# Patient Record
Sex: MALE | Race: WHITE | Employment: OTHER | URBAN - METROPOLITAN AREA
[De-identification: names, ages, dates, MRNs, and addresses within clinical notes are randomized per-mention and may not be internally consistent; named-entity substitution may affect disease eponyms.]

---

## 2017-01-17 ENCOUNTER — OFFICE VISIT (OUTPATIENT)
Dept: CARDIOLOGY | Facility: CLINIC | Age: 72
End: 2017-01-17
Attending: INTERNAL MEDICINE
Payer: COMMERCIAL

## 2017-01-17 VITALS
SYSTOLIC BLOOD PRESSURE: 124 MMHG | HEIGHT: 68 IN | OXYGEN SATURATION: 96 % | WEIGHT: 194.7 LBS | DIASTOLIC BLOOD PRESSURE: 80 MMHG | HEART RATE: 74 BPM | BODY MASS INDEX: 29.51 KG/M2

## 2017-01-17 DIAGNOSIS — I10 ESSENTIAL HYPERTENSION: ICD-10-CM

## 2017-01-17 DIAGNOSIS — I25.10 CORONARY ARTERY DISEASE INVOLVING NATIVE CORONARY ARTERY OF NATIVE HEART WITHOUT ANGINA PECTORIS: ICD-10-CM

## 2017-01-17 PROCEDURE — 99214 OFFICE O/P EST MOD 30 MIN: CPT | Performed by: INTERNAL MEDICINE

## 2017-01-17 RX ORDER — NITROGLYCERIN 0.4 MG/1
0.4 TABLET SUBLINGUAL EVERY 5 MIN PRN
Qty: 25 TABLET | Refills: 11 | Status: SHIPPED | OUTPATIENT
Start: 2017-01-17 | End: 2018-01-17

## 2017-01-17 RX ORDER — ATORVASTATIN CALCIUM 80 MG/1
80 TABLET, FILM COATED ORAL DAILY
COMMUNITY
End: 2020-01-20

## 2017-01-17 NOTE — PROGRESS NOTES
2017      Kenneth G. Pallas, MD   [facility]   [address]    [place]  [address]       RE: Nathan Martins   MRN: [id number]   :    1945      Dear Dr. Pallas:      Nathan Martins, a 71-year-old man with coronary artery disease, hypertension and dyslipidemia was seen today at your request for followup.      Mr. Mratins underwent a stress test prior to treatment of a left wrist fracture in  that demonstrated significant ischemia.  Diagnostic coronary angiography showed a significant proximal narrowing in the LAD with a distally occluded circumflex.  The dominant right coronary had atherosclerotic change, but no significant focal narrowing and the circumflex was well collateralized.  A bare metal stent was placed in the LAD and the patient underwent uncomplicated left wrist surgery.  A repeat angiogram 8 months after his procedure showed a patent LAD stent  with only mild in-stent restenosis.  The circumflex and right coronary anatomy was unchanged.  The ejection fraction was 50%.      Since I last saw the patient in 2015, he has remained asymptomatic.  He walks his dog on a regular basis and denies chest, arm, neck or jaw discomfort with exertion.  He has had some difficulty losing weight, but his weight has stayed stable.      PAST MEDICAL HISTORY:   1.  Hypertension.   2.  Dyslipidemia.   3.  Coronary artery disease:   A. History of abnormal stress test prior to left wrist surgery.  Significant narrowing in the proximal LAD treated with bare metal stent.  Chronically occluded mid circumflex.  Right coronary artery with moderate disease.  Ejection fraction of 50%.  Repeat angiography 8 months after procedure showing continued patency at intervention site.   4.  History of left wrist fracture.      PHYSICAL EXAMINATION:   GENERAL:  Exam today demonstrates a very pleasant, cooperative, soft spoken 71-year-old man.   VITAL SIGNS:  His blood pressure is 124/80,  his heart rate 74.  His height is 1.7 meters, his weight is 88.3 kg.  His BMI is 29.7.   LUNGS:  Clear to percussion and auscultation.   CARDIOVASCULAR:  Normal S1 with a normal S2, there is no S3.  There is no murmur, rub or click.      LABORATORY STUDIES:  There has not been a more recent LDL cholesterol than 10/2015.  At that time, his LDL was 60.  He remains on high-potency statin agent.      ASSESSMENT:  Mr. Martins is asymptomatic with optimal systolic blood pressure and on a high-potency statin drug.  I have reviewed the benefits of a Mediterranean style diet, weight loss and a regular exercise program in the reduction of future adverse cardiovascular events.      RECOMMENDATIONS:   1.  Continue present excellent medical therapy. It would be reasonable to recheck his lipid profile during his next visit with you.   2.  Mediterranean-style diet.   3.  Weight loss.   4.  Exercise program.   5.  Followup visit with me in 1 year.      We greatly appreciate the opportunity to be involved in the care of your patient, Mr. Nando Martins.      Sincerely,      MD FOREST Damon MD             D: 2017 10:20   T: 2017 13:16   MT:       Name:     NANDO MARTINS   MRN:      [id number]        Account:      [id number]   :      1945           Service Date: 2017      Document: [id number]

## 2017-01-17 NOTE — MR AVS SNAPSHOT
"              After Visit Summary   1/17/2017    Nathan Martins    MRN: [id number]           Patient Information     Date Of Birth          1945        Visit Information        Provider Department      1/17/2017 10:00 AM Raman Sabillon MD [facility]        Today's Diagnoses     Coronary artery disease involving native coronary artery of native heart without angina pectoris         Essential hypertension            Follow-ups after your visit        Additional Services     Follow-Up with Cardiologist           Follow-Up with Cardiologist                 Future tests that were ordered for you today     Open Future Orders        Priority Expected Expires Ordered    Follow-Up with Cardiologist Routine 1/17/2018 6/1/2018 1/17/2017    Follow-Up with Cardiologist Routine 1/17/2018 6/1/2018 1/17/2017            Who to contact     If you have questions or need follow up information about today's clinic visit or your schedule please contact [facility] directly at [phone].  Normal or non-critical lab and imaging results will be communicated to you by MyChart, letter or phone within 4 business days after the clinic has received the results. If you do not hear from us within 7 days, please contact the clinic through [employer] or phone. If you have a critical or abnormal lab result, we will notify you by phone as soon as possible.  Submit refill requests through [employer] [facility] or call your pharmacy and they will forward the refill request to us. Please allow 3 business days for your refill to be completed.          Additional Information About Your Visit        [employer] Information     [employer] lets you send messages to your doctor, view your test results, renew your prescriptions, schedule appointments and more. To sign up, go to [email or web address] . Click on \"Log in\" on the left side of the screen, which will take you to the " "Welcome page. Then click on \"Sign up Now\" on the right side of the page.     You will be asked to enter the access code listed below, as well as some personal information. Please follow the directions to create your username and password.     Your access code is: [email or web address]  Expires: 2017 10:16 AM     Your access code will  in 90 days. If you need help or a new code, please call your [facility] clinic or [phone].        Care EveryWhere ID     This is your Care EveryWhere ID. This could be used by other organizations to access your [facility] medical records  [id number]        Your Vitals Were     Pulse Height BMI (Body Mass Index) Pulse Oximetry          74 1.727 m (5' 8\") 29.61 kg/m2 96%         Blood Pressure from Last 3 Encounters:   17 124/80   12/18/15 120/78   14 138/80    Weight from Last 3 Encounters:   17 88.315 kg (194 lb 11.2 oz)   12/18/15 86.637 kg (191 lb)   14 86.637 kg (191 lb)              We Performed the Following     Follow-Up with Cardiologist          Today's Medication Changes          These changes are accurate as of: 17 10:16 AM.  If you have any questions, ask your nurse or doctor.               These medicines have changed or have updated prescriptions.        Dose/Directions    nitroglycerin 0.4 MG sublingual tablet   Commonly known as:  NITROSTAT   This may have changed:  how much to take   Used for:  Coronary artery disease involving native coronary artery of native heart without angina pectoris        Dose:  0.4 mg   Place 1 tablet (0.4 mg) under the tongue every 5 minutes as needed for chest pain   Quantity:  25 tablet   Refills:  11            Where to get your medicines      [facility] [address] [place] [address] - [address] AT [place] [address] & [facility] [place]  [address], [facility] [address]     Phone:  [phone]    - nitroglycerin 0.4 MG sublingual tablet             Primary " Care Provider Office Phone # Fax #    Kenneth G Pallas, [phone]       [facility] [address]  [facility] [address]        Thank you!     Thank you for choosing [facility]  for your care. Our goal is always to provide you with excellent care. Hearing back from our patients is one way we can continue to improve our services. Please take a few minutes to complete the written survey that you may receive in the mail after your visit with us. Thank you!             Your Updated Medication List - Protect others around you: Learn how to safely use, store and throw away your medicines at www.disposemymeds.org.          This list is accurate as of: 1/17/17 10:16 AM.  Always use your most recent med list.                   Brand Name Dispense Instructions for use    aspirin 81 MG tablet      Take by mouth daily       atorvastatin 80 MG tablet    LIPITOR     Take 80 mg by mouth daily       carvedilol 12.5 MG tablet    COREG     Take 12.5 mg by mouth 2 times daily (with meals)       lisinopril 5 MG tablet    PRINIVIL/ZESTRIL     Take 5 mg by mouth 2 times daily.       multivitamin, therapeutic with minerals Tabs tablet      Take 1 tablet by mouth daily.       nitroglycerin 0.4 MG sublingual tablet    NITROSTAT    25 tablet    Place 1 tablet (0.4 mg) under the tongue every 5 minutes as needed for chest pain

## 2017-01-17 NOTE — PROGRESS NOTES
HPI and Plan:   See dictation    No orders of the defined types were placed in this encounter.       Orders Placed This Encounter   Medications     atorvastatin (LIPITOR) 80 MG tablet     Sig: Take 80 mg by mouth daily     nitroglycerin (NITROSTAT) 0.4 MG sublingual tablet     Sig: Place 1 tablet (0.4 mg) under the tongue every 5 minutes as needed for chest pain     Dispense:  25 tablet     Refill:  11       Medications Discontinued During This Encounter   Medication Reason     simvastatin (ZOCOR) 40 MG tablet Unavailable     nitroglycerin (NITROSTAT) 0.4 MG SL tablet Reorder         Encounter Diagnoses   Name Primary?     Coronary artery disease involving native coronary artery of native heart without angina pectoris      Essential hypertension        CURRENT MEDICATIONS:  Current Outpatient Prescriptions   Medication Sig Dispense Refill     atorvastatin (LIPITOR) 80 MG tablet Take 80 mg by mouth daily       nitroglycerin (NITROSTAT) 0.4 MG sublingual tablet Place 1 tablet (0.4 mg) under the tongue every 5 minutes as needed for chest pain 25 tablet 11     carvedilol (COREG) 12.5 MG tablet Take 12.5 mg by mouth 2 times daily (with meals)       aspirin 81 MG tablet Take by mouth daily       lisinopril (PRINIVIL,ZESTRIL) 5 MG tablet Take 5 mg by mouth 2 times daily.       multivitamin, therapeutic with minerals (THERA-VIT-M) TABS Take 1 tablet by mouth daily.       [DISCONTINUED] nitroglycerin (NITROSTAT) 0.4 MG SL tablet Place under the tongue every 5 minutes as needed for chest pain         ALLERGIES   No Known Allergies    PAST MEDICAL HISTORY:  Past Medical History   Diagnosis Date     Hypertension      Stented coronary artery 4/3/12     04/12 Proximal RCA 20% ( collateral filling of OM), CFX occluded with collaterals,  (collateral filling of distal OM),Proximal LAD, before first septal + 1st diag., has 85% stenosis,  Balloon angioplasty and BMS to proximal LAD     Coronary artery disease      04/12  Proximal RCA 20% ( collateral filling of OM), CFX occluded with collaterals (collateral filling of distal OM), Proximal LAD, before first septal + 1st diag., has 85% stenosis,  Balloon angioplasty and BMS to proximal LAD, 12/2012 Cath - minimal in stent restenosis     Arthritis      generalized     Dyslipidemia      Cardiomyopathy (H)      4/2012 EF 30-35% by Cath, 12/2012 EF 50% by cath, 11/2012 EF 42% by Echo       PAST SURGICAL HISTORY:  Past Surgical History   Procedure Laterality Date     Orthopedic surgery       right elbow     Angioplasty  4/3/12      04/12 Proximal RCA 20% ( collateral filling of OM), CFX occluded with collaterals (collateral filling of distal OM), Proximal LAD, before first septal + 1st diag., has 85% stenosis,  Balloon angioplasty and BMS to proximal LAD, 12/2012 Cath - minimal in stent restenosis     Cataract iol, rt/lt       Osteotomy wrist  5/22/2012     Procedure:OSTEOTOMY WRIST; Left Distal Radius Corrective Osteotomy         FAMILY HISTORY:  Family History   Problem Relation Age of Onset     Other - See Comments Mother 96     old age     CANCER Father 47     pancreatic     DIABETES Sister        SOCIAL HISTORY:  Social History     Social History     Marital Status:      Spouse Name: N/A     Number of Children: N/A     Years of Education: N/A     Social History Main Topics     Smoking status: Former Smoker     Types: Cigarettes     Quit date: 01/05/2001     Smokeless tobacco: Not on file     Alcohol Use: 0.0 oz/week     0 Standard drinks or equivalent per week      Comment: occas     Drug Use: No     Sexual Activity: Not on file     Other Topics Concern     Caffeine Concern No     1-2 cups per day     Sleep Concern No     Stress Concern No     Weight Concern No     Special Diet No     Exercise Yes     walking dog 2 times daily     Seat Belt Yes     Social History Narrative       Review of Systems:  Skin:  Positive for bruising     Eyes:  Positive for glasses  "for computers  ENT:  Negative      Respiratory:  Negative       Cardiovascular:  Negative      Gastroenterology: Positive for reflux Occasional reflux takes TUMs prn  Genitourinary:  Negative      Musculoskeletal:  Positive for joint pain;joint stiffness;foot pain left knee - sees a chiropractor,  top of both feet hurt  Neurologic:  Negative      Psychiatric:  Negative      Heme/Lymph/Imm:  Negative      Endocrine:  Negative        Physical Exam:  Vitals: /80 mmHg  Pulse 74  Ht 1.727 m (5' 8\")  Wt 88.315 kg (194 lb 11.2 oz)  BMI 29.61 kg/m2  SpO2 96%    Constitutional:  cooperative, alert and oriented, well developed, well nourished, in no acute distress        Skin:  warm and dry to the touch, no apparent skin lesions or masses noted        Head:  normocephalic, no masses or lesions        Eyes:  pupils equal and round, conjunctivae and lids unremarkable, sclera white, no xanthalasma, EOMS intact, no nystagmus        ENT:  no pallor or cyanosis, dentition good        Neck:  carotid pulses are full and equal bilaterally, JVP normal, no carotid bruit, no thyromegaly        Chest:  normal breath sounds, clear to auscultation, normal A-P diameter, normal symmetry, normal respiratory excursion, no use of accessory muscles          Cardiac: regular rhythm, normal S1/S2, no S3 or S4, apical impulse not displaced, no murmurs, gallops or rubs                  Abdomen:  abdomen soft, non-tender, BS normoactive, no mass, no HSM, no bruits        Vascular: pulses full and equal, no bruits auscultated                                        Extremities and Back:  no deformities, clubbing, cyanosis, erythema observed              Neurological:  affect appropriate, oriented to time, person and place              CC  Raman Sabillon MD   [facility]  [address]  [place] [address]                "

## 2017-01-17 NOTE — Clinical Note
2017      Kenneth G. Pallas, MD   [facility]   [address]    [place]  [address]       RE: Nathan Martins   MRN: [id number]   :    1945      Dear Dr. Pallas:      Nathan Martins, a 71-year-old man with coronary artery disease, hypertension and dyslipidemia was seen today at your request for followup.      Mr. Martins underwent a stress test prior to treatment of a left wrist fracture in  that demonstrated significant ischemia.  Diagnostic coronary angiography showed a significant proximal narrowing in the LAD with a distally occluded circumflex.  The dominant right coronary had atherosclerotic change, but no significant focal narrowing and the circumflex was well collateralized.  A bare metal stent was placed in the LAD and the patient underwent uncomplicated left wrist surgery.  A repeat angiogram 8 months after his procedure showed a patent LAD stent  with only mild in-stent restenosis.  The circumflex and right coronary anatomy was unchanged.  The ejection fraction was 50%.      Since I last saw the patient in 2015, he has remained asymptomatic.  He walks his dog on a regular basis and denies chest, arm, neck or jaw discomfort with exertion.  He has had some difficulty losing weight, but his weight has stayed stable.      PAST MEDICAL HISTORY:   1.  Hypertension.   2.  Dyslipidemia.   3.  Coronary artery disease:   A. History of abnormal stress test prior to left wrist surgery.  Significant narrowing in the proximal LAD treated with bare metal stent.  Chronically occluded mid circumflex.  Right coronary artery with moderate disease.  Ejection fraction of 50%.  Repeat angiography 8 months after procedure showing continued patency at intervention site.   4.  History of left wrist fracture.      PHYSICAL EXAMINATION:   GENERAL:  Exam today demonstrates a very pleasant, cooperative, soft spoken 71-year-old man.   VITAL SIGNS:  His blood pressure is 124/80,  his heart rate 74.  His height is 1.7 meters, his weight is 88.3 kg.  His BMI is 29.7.   LUNGS:  Clear to percussion and auscultation.   CARDIOVASCULAR:  Normal S1 with a normal S2, there is no S3.  There is no murmur, rub or click.      LABORATORY STUDIES:  There has not been a more recent LDL cholesterol than 10/2015.  At that time, his LDL was 60.  He remains on high-potency statin agent.      ASSESSMENT:  Mr. Martins is asymptomatic with optimal systolic blood pressure and on a high-potency statin drug.  I have reviewed the benefits of a Mediterranean style diet, weight loss and a regular exercise program in the reduction of future adverse cardiovascular events.      RECOMMENDATIONS:   1.  Continue present excellent medical therapy. It would be reasonable to recheck his lipid profile during his next visit with you.   2.  Mediterranean-style diet.   3.  Weight loss.   4.  Exercise program.   5.  Followup visit with me in 1 year.      We greatly appreciate the opportunity to be involved in the care of your patient, Mr. Nathan Martins.      Sincerely,      Raman Sabillon MD

## 2017-12-12 ENCOUNTER — TRANSFERRED RECORDS (OUTPATIENT)
Dept: HEALTH INFORMATION MANAGEMENT | Facility: CLINIC | Age: 72
End: 2017-12-12

## 2017-12-12 LAB
ALBUMIN SERPL-MCNC: 3.6 G/DL (ref 3.4–5)
ALP SERPL-CCNC: 75 U/L (ref 0–116)
ALT SERPL-CCNC: 36 U/L (ref 0–78)
ANION GAP SERPL CALCULATED.3IONS-SCNC: ABNORMAL MMOL/L
AST SERPL-CCNC: 27 U/L (ref 0–37)
BILIRUB SERPL-MCNC: 0.88 MG/DL (ref 0.2–1)
BUN SERPL-MCNC: 14 MG/DL (ref 7–18)
CALCIUM SERPL-MCNC: 8.8 MG/DL (ref 9.5–10.1)
CHLORIDE SERPLBLD-SCNC: 109 MMOL/L (ref 98–107)
CHOLEST SERPL-MCNC: 129 MG/DL (ref 0–200)
CO2 SERPL-SCNC: ABNORMAL MMOL/L
CREAT SERPL-MCNC: 0.86 MG/DL (ref 0.6–1.3)
GFR SERPL CREATININE-BSD FRML MDRD: ABNORMAL ML/MIN/1.73M2
GLUCOSE SERPL-MCNC: 123 MG/DL (ref 70–99)
HDLC SERPL-MCNC: 58 MG/DL (ref 40–96)
LDLC SERPL CALC-MCNC: 59.4 MG/DL (ref 0–130)
NONHDLC SERPL-MCNC: NORMAL MG/DL
POTASSIUM SERPL-SCNC: 4.2 MMOL/L (ref 3.5–5.1)
PROT SERPL-MCNC: 6.4 G/DL (ref 6.4–8.2)
SODIUM SERPL-SCNC: 144 MMOL/L (ref 136–145)
TRIGL SERPL-MCNC: 53 MG/DL (ref 30–200)

## 2018-01-12 ENCOUNTER — DOCUMENTATION ONLY (OUTPATIENT)
Dept: CARDIOLOGY | Facility: CLINIC | Age: 73
End: 2018-01-12

## 2018-01-17 ENCOUNTER — OFFICE VISIT (OUTPATIENT)
Dept: CARDIOLOGY | Facility: CLINIC | Age: 73
End: 2018-01-17
Attending: INTERNAL MEDICINE
Payer: COMMERCIAL

## 2018-01-17 VITALS
SYSTOLIC BLOOD PRESSURE: 128 MMHG | BODY MASS INDEX: 28.64 KG/M2 | HEIGHT: 68 IN | WEIGHT: 189 LBS | HEART RATE: 72 BPM | DIASTOLIC BLOOD PRESSURE: 88 MMHG

## 2018-01-17 DIAGNOSIS — I25.10 CORONARY ARTERY DISEASE INVOLVING NATIVE CORONARY ARTERY OF NATIVE HEART WITHOUT ANGINA PECTORIS: ICD-10-CM

## 2018-01-17 DIAGNOSIS — I10 ESSENTIAL HYPERTENSION: ICD-10-CM

## 2018-01-17 PROCEDURE — 99214 OFFICE O/P EST MOD 30 MIN: CPT | Performed by: INTERNAL MEDICINE

## 2018-01-17 RX ORDER — NITROGLYCERIN 0.4 MG/1
0.4 TABLET SUBLINGUAL EVERY 5 MIN PRN
Qty: 25 TABLET | Refills: 11 | Status: SHIPPED | OUTPATIENT
Start: 2018-01-17 | End: 2019-01-18

## 2018-01-17 NOTE — PROGRESS NOTES
HISTORY OF PRESENT ILLNESS:  Nathan Martins, a 72-year-old man with coronary artery disease, hypertension, dyslipidemia, and a distant history of left wrist fracture was seen today at your request for followup.      Mr. Martins underwent a stress test prior to treatment of a left wrist fracture in 2012.  The stress test showed significant ischemia.  Diagnostic coronary angiography showed a significant narrowing in the proximal LAD with a distally occluded circumflex coronary.  The dominant right coronary had atherosclerotic change with no significant focal narrowing and the circumflex was well collateralized.  A bare metal stent was placed in the LAD and the patient underwent uncomplicated left wrist surgery.  Repeat angiogram 8 months after the procedure showed a patent LAD stent with no significant restenosis.  The circumflex and right coronary anatomy was unchanged.  The ejection fraction was 50%.      Since I last saw the patient about 1 year ago, he has remained asymptomatic.  He does not exercise as regularly as he would like to in the winter, but has been free of symptoms.  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      PAST MEDICAL HISTORY:   1.  Hypertension.   2.  Dyslipidemia.   3.  Coronary artery disease:   a.  Abnormal stress test prior to left wrist surgery.   b.  History of stent implantation in LAD.   c.  Repeat angiogram demonstrating continued patency of LAD stent   4.  Chronic distal occlusion of circumflex.  No significant narrowing in right coronary.   5.  History of left wrist fracture.      PHYSICAL EXAMINATION:   GENERAL:  Exam today demonstrates a very pleasant, cooperative and youthful appearing 72-year-old man.   VITAL SIGNS:  His blood pressure was 128/88, his heart rate was 72.   LUNGS:  Clear to percussion and auscultation.   CARDIOVASCULAR:  Shows a normal S1 with a normal S2.  There is no S3.  There is no murmur, rub or click.      LABORATORY  STUDIES:  An LDL cholesterol in your office in December was 59.      ASSESSMENT:  Mr. Martins is asymptomatic on guideline-directed medical therapy with optimal LDL cholesterol and systolic blood pressure levels.  I have nothing to add to his present excellent medical therapy.  I have reviewed the importance of a regular exercise program, weight loss, and a Mediterranean-style diet.  The patient plans to start working out on a treadmill this month.      RECOMMENDATIONS:   1.  Continue present medical therapy.   2.  Regular aerobic exercise 40 minutes 4 times a week and 7 times a week if possible.   3.  Mediterranean style diet.   4.  Weight control.   5.  Followup visit with me in about 1 year.      We greatly appreciate the opportunity to see your patient, Mr. Nando Martins.      cc:   Kenneth G. Pallas, MD   [address]  [address]         FOREST KIM MD             D: 2018 10:02   T: 2018 12:33   MT: KHANG      Name:     NANDO MARTNIS   MRN:      [id number]        Account:      [id number]   :      1945           Service Date: 2018      Document: [id number]

## 2018-01-17 NOTE — LETTER
1/17/2018      Kenneth G. Pallas, MD  [facility] [address]  [facility] [address]      RE: Nathan Martins       Dear Colleague,    I had the pleasure of seeing Nathan Martins in the [facility].    HISTORY OF PRESENT ILLNESS:  Nathan Martins, a 72-year-old man with coronary artery disease, hypertension, dyslipidemia, and a distant history of left wrist fracture was seen today at your request for followup.      Mr. Martins underwent a stress test prior to treatment of a left wrist fracture in 2012.  The stress test showed significant ischemia.  Diagnostic coronary angiography showed a significant narrowing in the proximal LAD with a distally occluded circumflex coronary.  The dominant right coronary had atherosclerotic change with no significant focal narrowing and the circumflex was well collateralized.  A bare metal stent was placed in the LAD and the patient underwent uncomplicated left wrist surgery.  Repeat angiogram 8 months after the procedure showed a patent LAD stent with no significant restenosis.  The circumflex and right coronary anatomy was unchanged.  The ejection fraction was 50%.      Since I last saw the patient about 1 year ago, he has remained asymptomatic.  He does not exercise as regularly as he would like to in the winter, but has been free of symptoms.  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      PAST MEDICAL HISTORY:   1.  Hypertension.   2.  Dyslipidemia.   3.  Coronary artery disease:   a.  Abnormal stress test prior to left wrist surgery.   b.  History of stent implantation in LAD.   c.  Repeat angiogram demonstrating continued patency of LAD stent   4.  Chronic distal occlusion of circumflex.  No significant narrowing in right coronary.   5.  History of left wrist fracture.      PHYSICAL EXAMINATION:   GENERAL:  Exam today demonstrates a very pleasant, cooperative and youthful appearing  72-year-old man.   VITAL SIGNS:  His blood pressure was 128/88, his heart rate was 72.   LUNGS:  Clear to percussion and auscultation.   CARDIOVASCULAR:  Shows a normal S1 with a normal S2.  There is no S3.  There is no murmur, rub or click.      LABORATORY STUDIES:  An LDL cholesterol in your office in December was 59.      ASSESSMENT:  Mr. Martins is asymptomatic on guideline-directed medical therapy with optimal LDL cholesterol and systolic blood pressure levels.  I have nothing to add to his present excellent medical therapy.  I have reviewed the importance of a regular exercise program, weight loss, and a Mediterranean-style diet.  The patient plans to start working out on a treadmill this month.      RECOMMENDATIONS:   1.  Continue present medical therapy.   2.  Regular aerobic exercise 40 minutes 4 times a week and 7 times a week if possible.   3.  Mediterranean style diet.   4.  Weight control.   5.  Followup visit with me in about 1 year.      We greatly appreciate the opportunity to see your patient, Mr. Nathan Martins.       Outpatient Encounter Prescriptions as of 1/17/2018   Medication Sig Dispense Refill     Multiple Vitamins-Minerals (CENTRUM SILVER 50+MEN PO) Take by mouth daily       cholecalciferol (VITAMIN  -D) 1000 UNITS capsule Take 1 capsule by mouth daily       Cyanocobalamin (VITAMIN B-12 SL) Place 1 mL under the tongue daily       nitroGLYcerin (NITROSTAT) 0.4 MG sublingual tablet Place 1 tablet (0.4 mg) under the tongue every 5 minutes as needed for chest pain 25 tablet 11     atorvastatin (LIPITOR) 80 MG tablet Take 80 mg by mouth daily       carvedilol (COREG) 12.5 MG tablet Take 12.5 mg by mouth 2 times daily (with meals)       aspirin 81 MG tablet Take by mouth daily       lisinopril (PRINIVIL,ZESTRIL) 5 MG tablet Take 5 mg by mouth 2 times daily.       [DISCONTINUED] nitroglycerin (NITROSTAT) 0.4 MG sublingual tablet Place 1 tablet (0.4 mg) under the tongue every 5 minutes as needed for  chest pain 25 tablet 11     [DISCONTINUED] multivitamin, therapeutic with minerals (THERA-VIT-M) TABS Take 1 tablet by mouth daily.       No facility-administered encounter medications on file as of 1/17/2018.        Again, thank you for allowing me to participate in the care of your patient.      Sincerely,    Raman Sabillon MD     [facility]

## 2018-01-17 NOTE — MR AVS SNAPSHOT
"              After Visit Summary   1/17/2018    Nathan Martins    MRN: [id number]           Patient Information     Date Of Birth          1945        Visit Information        Provider Department      1/17/2018 9:30 AM Raman Sabillon MD [facility]        Today's Diagnoses     Coronary artery disease involving native coronary artery of native heart without angina pectoris        Essential hypertension           Follow-ups after your visit        Additional Services     Follow-Up with Cardiologist           Follow-Up with Cardiologist                 Future tests that were ordered for you today     Open Future Orders        Priority Expected Expires Ordered    Follow-Up with Cardiologist Routine 1/17/2019 1/18/2019 1/17/2018    Follow-Up with Cardiologist Routine 1/17/2019 1/18/2019 1/17/2018            Who to contact     If you have questions or need follow up information about today's clinic visit or your schedule please contact [facility] directly at [phone].  Normal or non-critical lab and imaging results will be communicated to you by [employer], letter or phone within 4 business days after the clinic has received the results. If you do not hear from us within 7 days, please contact the clinic through [employer] or phone. If you have a critical or abnormal lab result, we will notify you by phone as soon as possible.  Submit refill requests through [employer] or call your pharmacy and they will forward the refill request to us. Please allow 3 business days for your refill to be completed.          Additional Information About Your Visit        [employer]Be[employer] Information     [employer] lets you send messages to your doctor, view your test results, renew your prescriptions, schedule appointments and more. To sign up, go to [email or web address] . Click on \"Log in\" on the left side of the screen, which will take you to the " "Welcome page. Then click on \"Sign up Now\" on the right side of the page.     You will be asked to enter the access code listed below, as well as some personal information. Please follow the directions to create your username and password.     Your access code is: [email or web address]  Expires: 2018  9:52 AM     Your access code will  in 90 days. If you need help or a new code, please call your [facility] clinic or [phone].        Care EveryWhere ID     This is your Care EveryWhere ID. This could be used by other organizations to access your [facility] medical records  [id number]        Your Vitals Were     Pulse Height BMI (Body Mass Index)             72 1.727 m (5' 8\") 28.74 kg/m2          Blood Pressure from Last 3 Encounters:   18 128/88   17 124/80   12/18/15 120/78    Weight from Last 3 Encounters:   18 85.7 kg (189 lb)   17 88.3 kg (194 lb 11.2 oz)   12/18/15 86.6 kg (191 lb)              We Performed the Following     Follow-Up with Cardiologist     Follow-Up with Cardiologist          Where to get your medicines      These medications were sent to [facility] [address] [place] [address] [id number] AT [place] [address] & [facility]  [address], [facility] [address]     Phone:  [phone]     nitroGLYcerin 0.4 MG sublingual tablet          Primary Care Provider Office Phone # Fax #    Kenneth G Pallas, [phone]       [facility] [address] [facility] [address]        Equal Access to Services     ILIANA [facility] HospitalROSIE AH: Hadii ruth Matson, saurabh fischer, kamala mackalmamelissa wallis, karin vera. So [facility] [phone].    ATENCIÓN: Si habla español, tiene a arreola disposición servicios gratuitos de asistencia lingüística. Llame al [phone].    We comply with applicable federal civil rights laws and [place] laws. We do not discriminate on the basis of race, color, national " origin, age, disability, sex, sexual orientation, or gender identity.            Thank you!     Thank you for choosing [facility]  for your care. Our goal is always to provide you with excellent care. Hearing back from our patients is one way we can continue to improve our services. Please take a few minutes to complete the written survey that you may receive in the mail after your visit with us. Thank you!             Your Updated Medication List - Protect others around you: Learn how to safely use, store and throw away your medicines at www.disposemymeds.org.          This list is accurate as of: 1/17/18  9:57 AM.  Always use your most recent med list.                   Brand Name Dispense Instructions for use Diagnosis    aspirin 81 MG tablet      Take by mouth daily        atorvastatin 80 MG tablet    LIPITOR     Take 80 mg by mouth daily        carvedilol 12.5 MG tablet    COREG     Take 12.5 mg by mouth 2 times daily (with meals)        CENTRUM SILVER 50+MEN PO      Take by mouth daily        cholecalciferol 1000 UNITS capsule    vitamin  -D     Take 1 capsule by mouth daily        lisinopril 5 MG tablet    PRINIVIL/ZESTRIL     Take 5 mg by mouth 2 times daily.        nitroGLYcerin 0.4 MG sublingual tablet    NITROSTAT    25 tablet    Place 1 tablet (0.4 mg) under the tongue every 5 minutes as needed for chest pain    Coronary artery disease involving native coronary artery of native heart without angina pectoris       VITAMIN B-12 SL      Place 1 mL under the tongue daily

## 2018-01-17 NOTE — PROGRESS NOTES
HISTORY OF PRESENT ILLNESS:    Doing well. NO angina.     Orders this Visit:  Orders Placed This Encounter   Procedures     Follow-Up with Cardiologist     Orders Placed This Encounter   Medications     Multiple Vitamins-Minerals (CENTRUM SILVER 50+MEN PO)     Sig: Take by mouth daily     cholecalciferol (VITAMIN  -D) 1000 UNITS capsule     Sig: Take 1 capsule by mouth daily     Cyanocobalamin (VITAMIN B-12 SL)     Sig: Place 1 mL under the tongue daily     Medications Discontinued During This Encounter   Medication Reason     multivitamin, therapeutic with minerals (THERA-VIT-M) TABS Medication Reconciliation Clean Up       Encounter Diagnoses   Name Primary?     Coronary artery disease involving native coronary artery of native heart without angina pectoris      Essential hypertension        CURRENT MEDICATIONS:  Current Outpatient Prescriptions   Medication Sig Dispense Refill     Multiple Vitamins-Minerals (CENTRUM SILVER 50+MEN PO) Take by mouth daily       cholecalciferol (VITAMIN  -D) 1000 UNITS capsule Take 1 capsule by mouth daily       Cyanocobalamin (VITAMIN B-12 SL) Place 1 mL under the tongue daily       atorvastatin (LIPITOR) 80 MG tablet Take 80 mg by mouth daily       nitroglycerin (NITROSTAT) 0.4 MG sublingual tablet Place 1 tablet (0.4 mg) under the tongue every 5 minutes as needed for chest pain 25 tablet 11     carvedilol (COREG) 12.5 MG tablet Take 12.5 mg by mouth 2 times daily (with meals)       aspirin 81 MG tablet Take by mouth daily       lisinopril (PRINIVIL,ZESTRIL) 5 MG tablet Take 5 mg by mouth 2 times daily.         ALLERGIES   No Known Allergies    PAST MEDICAL, SURGICAL, FAMILY, SOCIAL HISTORY:  History was reviewed and updated as needed, see medical record.    Review of Systems:  A 12-point review of systems was completed, see medical record for detailed review of systems information.    Physical Exam:  Vitals: /88 (BP Location: Right arm, Patient Position: Sitting, Cuff Size:  "Adult Regular)  Pulse 72  Ht 1.727 m (5' 8\")  Wt 85.7 kg (189 lb)  BMI 28.74 kg/m2    Constitutional:           Skin:           Head:           Eyes:           ENT:           Neck:           Chest:           Cardiac:                    Abdomen:           Vascular:                                        Extremities and Back:           Neurological:           ASSESSMENT: doing well       RECOMMENDATIONS: GDMT fu in one year      Recent Lab Results:  LIPID RESULTS:  Lab Results   Component Value Date    CHOL 129.0 12/12/2017    HDL 58.0 12/12/2017    LDL 59.4 12/12/2017    TRIG 53.0 12/12/2017    CHOLHDLRATIO 2.2 10/29/2015       LIVER ENZYME RESULTS:  Lab Results   Component Value Date    AST 27.0 08/12/2017    ALT 36.0 08/12/2017       CBC RESULTS:  Lab Results   Component Value Date    WBC 9.6 06/11/2014    RBC 4.56 06/11/2014    HGB 14.9 06/11/2014    HCT 44.4 06/11/2014    MCV 97.4 06/11/2014    MCH 32.7 06/11/2014    MCHC 33.6 06/11/2014    RDW 14.7 06/11/2014     06/11/2014     12/05/2012       BMP RESULTS:  Lab Results   Component Value Date    .0 08/12/2017    POTASSIUM 4.2 08/12/2017    CHLORIDE 109.0 (A) 08/12/2017    CO2 25 12/05/2012    ANIONGAP 10 12/05/2012    .0 (A) 08/12/2017    BUN 14.0 08/12/2017    CR 0.86 08/12/2017    GFRESTIMATED >90 12/05/2012    GFRESTBLACK >90 12/05/2012    TORY 8.8 (A) 08/12/2017        A1C RESULTS:  No results found for: A1C    INR RESULTS:  Lab Results   Component Value Date    INR 1.03 12/05/2012       We greatly appreciate the opportunity to be involved in the care of your patient, Nathan Martins.    Sincerely,  Raman Sabillon MD      CC  Raman Sabillon MD  [address]  [place] [id number]                                                                     "

## 2019-01-18 ENCOUNTER — OFFICE VISIT (OUTPATIENT)
Dept: CARDIOLOGY | Facility: CLINIC | Age: 74
End: 2019-01-18
Payer: COMMERCIAL

## 2019-01-18 VITALS
WEIGHT: 188.2 LBS | SYSTOLIC BLOOD PRESSURE: 122 MMHG | HEIGHT: 69 IN | DIASTOLIC BLOOD PRESSURE: 80 MMHG | HEART RATE: 66 BPM | BODY MASS INDEX: 27.88 KG/M2

## 2019-01-18 DIAGNOSIS — I10 ESSENTIAL HYPERTENSION: ICD-10-CM

## 2019-01-18 DIAGNOSIS — I25.10 CORONARY ARTERY DISEASE INVOLVING NATIVE CORONARY ARTERY OF NATIVE HEART WITHOUT ANGINA PECTORIS: Primary | ICD-10-CM

## 2019-01-18 PROCEDURE — 99214 OFFICE O/P EST MOD 30 MIN: CPT | Performed by: INTERNAL MEDICINE

## 2019-01-18 RX ORDER — NITROGLYCERIN 0.4 MG/1
0.4 TABLET SUBLINGUAL EVERY 5 MIN PRN
Qty: 25 TABLET | Refills: 11 | Status: SHIPPED | OUTPATIENT
Start: 2019-01-18 | End: 2020-01-20

## 2019-01-18 ASSESSMENT — MIFFLIN-ST. JEOR: SCORE: 1589.05

## 2019-01-18 NOTE — PROGRESS NOTES
Service Date: 01/18/2019      HISTORY OF PRESENT ILLNESS:  Nathan Martins, a 73-year-old man with coronary artery disease, hypertension, and dyslipidemia  was seen today at your request for followup.      Since I saw the patient 1 year ago, he remains entirely free of symptoms.  He walks  on a regular basis without chest pain, dyspnea, palpitation or lightheadedness.  He tolerates his medications well.      He has a followup visit with you planned in the next several days.      PAST MEDICAL HISTORY:   1.  Hypertension.   2.  Dyslipidemia.   3.  Coronary artery disease:   a.  History of abnormal stress test prior to left wrist surgery.   b.  Diagnostic angiography showing distal occlusion of circumflex with good collaterals.  No significant narrowing in dominant right coronary.  Normal left main.  Significant narrowing in proximal LAD successfully treated with bare metal stent.  Repeat angiography 1 year later showing continued patency at the LAD stent site and no change in other vessels.  4.  History of left wrist fracture.      PHYSICAL EXAMINATION:   GENERAL:  Exam today demonstrates a very pleasant, cooperative and intelligent 73-year-old man who looks much younger than stated age.   VITAL SIGNS:  His blood pressure is 122/80, his heart rate is 66.  His height is 1.75 meters, his weight is 85.4 kg.  His BMI is 27.9.   LUNGS:  Clear to percussion and auscultation.   CARDIOVASCULAR:  Exam shows a normal S1 with a normal S2, no S3.  There is no murmur, rub or click.      LABORATORY STUDIES:  His most recent lipid profile was in 2017 that showed an LDL cholesterol of 59.  His last creatinine was 0.86.  Both values will be repeated at the time of his upcoming visit with you.      ASSESSMENT:  Mr. Martins is asymptomatic on guideline-directed medical therapy with optimal systolic blood pressure and LDL cholesterol levels.  I have nothing to add to his current excellent medical therapy and would encourage continued  lifestyle intervention.      RECOMMENDATIONS:   1.  Mediterranean-style weight-loss diet.   2.  Regular exercise at least 4-7 times a week.   3.  Continue present medical therapy.   4.  Followup visit in 1 year.      We greatly appreciate the opportunity to be involved in the care of your patient, Mr. Martins.      Raman Sabillon MD       cc:   Kenneth Pallas, MD    [address]  [address]         RAMAN SABILLON MD             D: 2019   T: 2019   MT: JAILENE      Name:     NANDO MARTINS   MRN:      [id number]        Account:      [id number]   :      1945           Service Date: 2019      Document: [id number]

## 2019-01-18 NOTE — LETTER
"1/18/2019    Kenneth G. Pallas, MD  [facility] [address]  [facility] [address]    RE: Nathan Martins       Dear Colleague,    I had the pleasure of seeing Nathan Martins in the [facility].    HISTORY OF PRESENT ILLNESS:  Asymptomatic. bp great. Walks regularily.     Orders this Visit:  No orders of the defined types were placed in this encounter.    No orders of the defined types were placed in this encounter.    There are no discontinued medications.    No diagnosis found.    CURRENT MEDICATIONS:  Current Outpatient Medications   Medication Sig Dispense Refill     aspirin 81 MG tablet Take by mouth daily       atorvastatin (LIPITOR) 80 MG tablet Take 80 mg by mouth daily       carvedilol (COREG) 12.5 MG tablet Take 12.5 mg by mouth 2 times daily (with meals)       cholecalciferol (VITAMIN  -D) 1000 UNITS capsule Take 1 capsule by mouth daily       lisinopril (PRINIVIL,ZESTRIL) 5 MG tablet Take 5 mg by mouth 2 times daily.       Multiple Vitamins-Minerals (CENTRUM SILVER 50+MEN PO) Take by mouth daily       nitroGLYcerin (NITROSTAT) 0.4 MG sublingual tablet Place 1 tablet (0.4 mg) under the tongue every 5 minutes as needed for chest pain 25 tablet 11     Cyanocobalamin (VITAMIN B-12 SL) Place 1 mL under the tongue daily         ALLERGIES   No Known Allergies    PAST MEDICAL, SURGICAL, FAMILY, SOCIAL HISTORY:  History was reviewed and updated as needed, see medical record.    Review of Systems:  A 12-point review of systems was completed, see medical record for detailed review of systems information.    Physical Exam:  Vitals: /80 (BP Location: Right arm, Patient Position: Sitting, Cuff Size: Adult Regular)   Pulse 66   Ht 1.753 m (5' 9\")   Wt 85.4 kg (188 lb 3.2 oz)   BMI 27.79 kg/m       Constitutional:           Skin:           Head:           Eyes:           ENT:           Neck:           Chest:           Cardiac:                    Abdomen: "           Vascular:                                        Extremities and Back:           Neurological:           ASSESSMENT:  Doing well       RECOMMENDATIONS: continue present medications      Recent Lab Results:  LIPID RESULTS:  Lab Results   Component Value Date    CHOL 129.0 12/12/2017    HDL 58.0 12/12/2017    LDL 59.4 12/12/2017    TRIG 53.0 12/12/2017    CHOLHDLRATIO 2.2 10/29/2015       LIVER ENZYME RESULTS:  Lab Results   Component Value Date    AST 27.0 08/12/2017    ALT 36.0 08/12/2017       CBC RESULTS:  Lab Results   Component Value Date    WBC 9.6 06/11/2014    RBC 4.56 06/11/2014    HGB 14.9 06/11/2014    HCT 44.4 06/11/2014    MCV 97.4 06/11/2014    MCH 32.7 06/11/2014    MCHC 33.6 06/11/2014    RDW 14.7 06/11/2014     06/11/2014     12/05/2012       BMP RESULTS:  Lab Results   Component Value Date    .0 08/12/2017    POTASSIUM 4.2 08/12/2017    CHLORIDE 109.0 (A) 08/12/2017    CO2 25 12/05/2012    ANIONGAP 10 12/05/2012    .0 (A) 08/12/2017    BUN 14.0 08/12/2017    CR 0.86 08/12/2017    GFRESTIMATED >90 12/05/2012    GFRESTBLACK >90 12/05/2012    TORY 8.8 (A) 08/12/2017        A1C RESULTS:  No results found for: A1C    INR RESULTS:  Lab Results   Component Value Date    INR 1.03 12/05/2012       We greatly appreciate the opportunity to be involved in the care of your patient, Nathan Martins.    Sincerely,  Raman Sabillon MD      CC  Kenneth G Pallas, MD  [facility]  [address] [place] [address]                                                                       Thank you for allowing me to participate in the care of your patient.      Sincerely,     Raman Sabillon MD     [facility]    cc:   Kenneth G Pallas, MD  [facility]  [address] [place] [address]

## 2019-01-18 NOTE — LETTER
1/18/2019      Kenneth G. Pallas, MD  [facility] [address]  [facility] [address]      RE: Nathan Martins       Dear Colleague,    I had the pleasure of seeing Nathan Martins in the [facility].    Service Date: 01/18/2019      HISTORY OF PRESENT ILLNESS:  Nathan Martins, a 73-year-old man with coronary artery disease, hypertension, and dyslipidemia  was seen today at your request for followup.      Since I saw the patient 1 year ago, he remains entirely free of symptoms.  He walks  on a regular basis without chest pain, dyspnea, palpitation or lightheadedness.  He tolerates his medications well.      He has a followup visit with you planned in the next several days.      PAST MEDICAL HISTORY:   1.  Hypertension.   2.  Dyslipidemia.   3.  Coronary artery disease:   a.  History of abnormal stress test prior to left wrist surgery.   b.  Diagnostic angiography showing distal occlusion of circumflex with good collaterals.  No significant narrowing in dominant right coronary.  Normal left main.  Significant narrowing in proximal LAD successfully treated with bare metal stent.  Repeat angiography 1 year later showing continued patency at the LAD stent site and no change in other vessels.  4.  History of left wrist fracture.      PHYSICAL EXAMINATION:   GENERAL:  Exam today demonstrates a very pleasant, cooperative and intelligent 73-year-old man who looks much younger than stated age.   VITAL SIGNS:  His blood pressure is 122/80, his heart rate is 66.  His height is 1.75 meters, his weight is 85.4 kg.  His BMI is 27.9.   LUNGS:  Clear to percussion and auscultation.   CARDIOVASCULAR:  Exam shows a normal S1 with a normal S2, no S3.  There is no murmur, rub or click.      LABORATORY STUDIES:  His most recent lipid profile was in 2017 that showed an LDL cholesterol of 59.  His last creatinine was 0.86.  Both values will be repeated at the time of his upcoming  visit with you.      ASSESSMENT:  Mr. Martins is asymptomatic on guideline-directed medical therapy with optimal systolic blood pressure and LDL cholesterol levels.  I have nothing to add to his current excellent medical therapy and would encourage continued lifestyle intervention.      RECOMMENDATIONS:   1.  Mediterranean-style weight-loss diet.   2.  Regular exercise at least 4-7 times a week.   3.  Continue present medical therapy.   4.  Followup visit in 1 year.      We greatly appreciate the opportunity to be involved in the care of your patient, Mr. Martins.      Raman Sabillon MD       cc:   Kenneth Pallas, MD    [address]  [address]         RAMAN SABILLON MD             D: 2019   T: 2019   MT: JAILENE      Name:     NANDO MARTINS   MRN:      [id number]        Account:      [id number]   :      1945           Service Date: 2019      Document: [id number]           Outpatient Encounter Medications as of 2019   Medication Sig Dispense Refill     aspirin 81 MG tablet Take by mouth daily       atorvastatin (LIPITOR) 80 MG tablet Take 80 mg by mouth daily       carvedilol (COREG) 12.5 MG tablet Take 12.5 mg by mouth 2 times daily (with meals)       cholecalciferol (VITAMIN  -D) 1000 UNITS capsule Take 1 capsule by mouth daily       lisinopril (PRINIVIL,ZESTRIL) 5 MG tablet Take 5 mg by mouth 2 times daily.       Multiple Vitamins-Minerals (CENTRUM SILVER 50+MEN PO) Take by mouth daily       nitroGLYcerin (NITROSTAT) 0.4 MG sublingual tablet Place 1 tablet (0.4 mg) under the tongue every 5 minutes as needed for chest pain 25 tablet 11     Cyanocobalamin (VITAMIN B-12 SL) Place 1 mL under the tongue daily       [DISCONTINUED] nitroGLYcerin (NITROSTAT) 0.4 MG sublingual tablet Place 1 tablet (0.4 mg) under the tongue every 5 minutes as needed for chest pain 25 tablet 11     No facility-administered encounter  medications on file as of 1/18/2019.        Again, thank you for allowing me to participate in the care of your patient.      Sincerely,    Raman Sabillon MD     [facility]

## 2019-01-18 NOTE — PROGRESS NOTES
"HISTORY OF PRESENT ILLNESS:  Asymptomatic. bp great. Walks regularily.     Orders this Visit:  No orders of the defined types were placed in this encounter.    No orders of the defined types were placed in this encounter.    There are no discontinued medications.    No diagnosis found.    CURRENT MEDICATIONS:  Current Outpatient Medications   Medication Sig Dispense Refill     aspirin 81 MG tablet Take by mouth daily       atorvastatin (LIPITOR) 80 MG tablet Take 80 mg by mouth daily       carvedilol (COREG) 12.5 MG tablet Take 12.5 mg by mouth 2 times daily (with meals)       cholecalciferol (VITAMIN  -D) 1000 UNITS capsule Take 1 capsule by mouth daily       lisinopril (PRINIVIL,ZESTRIL) 5 MG tablet Take 5 mg by mouth 2 times daily.       Multiple Vitamins-Minerals (CENTRUM SILVER 50+MEN PO) Take by mouth daily       nitroGLYcerin (NITROSTAT) 0.4 MG sublingual tablet Place 1 tablet (0.4 mg) under the tongue every 5 minutes as needed for chest pain 25 tablet 11     Cyanocobalamin (VITAMIN B-12 SL) Place 1 mL under the tongue daily         ALLERGIES   No Known Allergies    PAST MEDICAL, SURGICAL, FAMILY, SOCIAL HISTORY:  History was reviewed and updated as needed, see medical record.    Review of Systems:  A 12-point review of systems was completed, see medical record for detailed review of systems information.    Physical Exam:  Vitals: /80 (BP Location: Right arm, Patient Position: Sitting, Cuff Size: Adult Regular)   Pulse 66   Ht 1.753 m (5' 9\")   Wt 85.4 kg (188 lb 3.2 oz)   BMI 27.79 kg/m      Constitutional:           Skin:           Head:           Eyes:           ENT:           Neck:           Chest:           Cardiac:                    Abdomen:           Vascular:                                        Extremities and Back:           Neurological:           ASSESSMENT:  Doing well       RECOMMENDATIONS: continue present medications      Recent Lab Results:  LIPID RESULTS:  Lab Results "   Component Value Date    CHOL 129.0 12/12/2017    HDL 58.0 12/12/2017    LDL 59.4 12/12/2017    TRIG 53.0 12/12/2017    CHOLHDLRATIO 2.2 10/29/2015       LIVER ENZYME RESULTS:  Lab Results   Component Value Date    AST 27.0 08/12/2017    ALT 36.0 08/12/2017       CBC RESULTS:  Lab Results   Component Value Date    WBC 9.6 06/11/2014    RBC 4.56 06/11/2014    HGB 14.9 06/11/2014    HCT 44.4 06/11/2014    MCV 97.4 06/11/2014    MCH 32.7 06/11/2014    MCHC 33.6 06/11/2014    RDW 14.7 06/11/2014     06/11/2014     12/05/2012       BMP RESULTS:  Lab Results   Component Value Date    .0 08/12/2017    POTASSIUM 4.2 08/12/2017    CHLORIDE 109.0 (A) 08/12/2017    CO2 25 12/05/2012    ANIONGAP 10 12/05/2012    .0 (A) 08/12/2017    BUN 14.0 08/12/2017    CR 0.86 08/12/2017    GFRESTIMATED >90 12/05/2012    GFRESTBLACK >90 12/05/2012    TORY 8.8 (A) 08/12/2017        A1C RESULTS:  No results found for: A1C    INR RESULTS:  Lab Results   Component Value Date    INR 1.03 12/05/2012       We greatly appreciate the opportunity to be involved in the care of your patient, Nathan MENARD Eliezer.    Sincerely,  Raman Sabillon MD      CC  Kenneth G Pallas, MD  [facility]  [address] [place] [address]

## 2019-01-21 ENCOUNTER — TRANSFERRED RECORDS (OUTPATIENT)
Dept: HEALTH INFORMATION MANAGEMENT | Facility: CLINIC | Age: 74
End: 2019-01-21

## 2019-01-21 LAB
ALBUMIN SERPL-MCNC: 3.7 G/DL (ref 3.4–5)
ALP SERPL-CCNC: 65 U/L (ref 0–116)
ALT SERPL-CCNC: 34 U/L (ref 0–78)
ANION GAP SERPL CALCULATED.3IONS-SCNC: ABNORMAL MMOL/L
AST SERPL-CCNC: 22 U/L (ref 0–37)
BASOPHILS NFR BLD AUTO: 0.4 % (ref 0–2)
BILIRUB SERPL-MCNC: 0.54 MG/DL (ref 0.2–1)
BUN SERPL-MCNC: 16 MG/DL (ref 7–18)
CALCIUM SERPL-MCNC: 8.7 MG/DL (ref 8.5–10.1)
CHLORIDE SERPLBLD-SCNC: 107 MMOL/L (ref 98–107)
CHOLEST SERPL-MCNC: 133 MG/DL (ref 0–200)
CO2 SERPL-SCNC: ABNORMAL MMOL/L
CREAT SERPL-MCNC: 0.84 MG/DL (ref 0.6–1.3)
EOSINOPHIL NFR BLD AUTO: 5.6 % (ref 0–6)
ERYTHROCYTE [DISTWIDTH] IN BLOOD BY AUTOMATED COUNT: 13.6 % (ref 11.5–14.5)
GFR SERPL CREATININE-BSD FRML MDRD: ABNORMAL ML/MIN/1.73M2
GLUCOSE SERPL-MCNC: 113 MG/DL (ref 70–99)
HCT VFR BLD AUTO: 44 % (ref 40–51)
HDLC SERPL-MCNC: 55 MG/DL (ref 40–96)
HEMOGLOBIN: 14.6 G/DL (ref 14–17)
LDLC SERPL CALC-MCNC: 68.8 MG/DL (ref 0–130)
LYMPHOCYTES NFR BLD AUTO: 16.7 % (ref 20–46)
MCH RBC QN AUTO: 31.8 PG (ref 26.5–33)
MCHC RBC AUTO-ENTMCNC: 33.2 G/DL (ref 31.5–36.5)
MCV RBC AUTO: 95.9 FL (ref 78–99)
MONOCYTES NFR BLD AUTO: 8.2 % (ref 0–12)
NEUTROPHILS NFR BLD AUTO: 68.8 % (ref 38–70)
PLATELET COUNT - QUEST: 206 10^9/L (ref 150–430)
POTASSIUM SERPL-SCNC: 4.4 MMOL/L (ref 3.5–5.1)
PROT SERPL-MCNC: 6.5 G/DL (ref 6.4–8.2)
RBC # BLD AUTO: 4.59 10^12/L (ref 4.4–5.7)
SODIUM SERPL-SCNC: 143 MMOL/L (ref 136–145)
TRIGL SERPL-MCNC: 46 MG/DL (ref 30–200)
VLDL CHOLESTEROL: 9.2 MG/DL (ref 5–40)
WBC # BLD AUTO: 9 10^9/L (ref 4–10)

## 2019-01-23 ENCOUNTER — DOCUMENTATION ONLY (OUTPATIENT)
Dept: CARDIOLOGY | Facility: CLINIC | Age: 74
End: 2019-01-23

## 2019-01-23 NOTE — PROGRESS NOTES
Lab results received from [facility]. Labs entered as external results and records sent to scan.

## 2020-01-20 ENCOUNTER — OFFICE VISIT (OUTPATIENT)
Dept: CARDIOLOGY | Facility: CLINIC | Age: 75
End: 2020-01-20
Payer: COMMERCIAL

## 2020-01-20 VITALS
SYSTOLIC BLOOD PRESSURE: 140 MMHG | HEART RATE: 80 BPM | DIASTOLIC BLOOD PRESSURE: 80 MMHG | WEIGHT: 181 LBS | HEIGHT: 68 IN | BODY MASS INDEX: 27.43 KG/M2

## 2020-01-20 DIAGNOSIS — I10 ESSENTIAL HYPERTENSION: ICD-10-CM

## 2020-01-20 DIAGNOSIS — I25.10 CORONARY ARTERY DISEASE INVOLVING NATIVE CORONARY ARTERY OF NATIVE HEART WITHOUT ANGINA PECTORIS: Primary | ICD-10-CM

## 2020-01-20 PROCEDURE — 99214 OFFICE O/P EST MOD 30 MIN: CPT | Performed by: INTERNAL MEDICINE

## 2020-01-20 RX ORDER — CARVEDILOL 12.5 MG/1
12.5 TABLET ORAL 2 TIMES DAILY WITH MEALS
Qty: 60 TABLET | Refills: 11 | Status: SHIPPED | OUTPATIENT
Start: 2020-01-20 | End: 2021-01-14

## 2020-01-20 RX ORDER — ATORVASTATIN CALCIUM 80 MG/1
80 TABLET, FILM COATED ORAL DAILY
Qty: 30 TABLET | Refills: 11 | Status: SHIPPED | OUTPATIENT
Start: 2020-01-20 | End: 2021-01-14

## 2020-01-20 RX ORDER — LISINOPRIL 10 MG/1
20 TABLET ORAL 2 TIMES DAILY
Qty: 60 TABLET | Refills: 11 | Status: SHIPPED | OUTPATIENT
Start: 2020-01-20 | End: 2021-01-14

## 2020-01-20 RX ORDER — NITROGLYCERIN 0.4 MG/1
0.4 TABLET SUBLINGUAL EVERY 5 MIN PRN
Qty: 25 TABLET | Refills: 11 | Status: SHIPPED | OUTPATIENT
Start: 2020-01-20 | End: 2021-01-14

## 2020-01-20 ASSESSMENT — MIFFLIN-ST. JEOR: SCORE: 1535.51

## 2020-01-20 NOTE — PROGRESS NOTES
"HISTORY OF PRESENT ILLNESS:  Asymptomatic,     Orders this Visit:  No orders of the defined types were placed in this encounter.    No orders of the defined types were placed in this encounter.    Medications Discontinued During This Encounter   Medication Reason     Cyanocobalamin (VITAMIN B-12 SL)        No diagnosis found.    CURRENT MEDICATIONS:  Current Outpatient Medications   Medication Sig Dispense Refill     aspirin 81 MG tablet Take by mouth daily       atorvastatin (LIPITOR) 80 MG tablet Take 80 mg by mouth daily       carvedilol (COREG) 12.5 MG tablet Take 12.5 mg by mouth 2 times daily (with meals)       cholecalciferol (VITAMIN  -D) 1000 UNITS capsule Take 1 capsule by mouth daily       lisinopril (PRINIVIL,ZESTRIL) 5 MG tablet Take 5 mg by mouth 2 times daily.       Multiple Vitamins-Minerals (CENTRUM SILVER 50+MEN PO) Take by mouth daily       nitroGLYcerin (NITROSTAT) 0.4 MG sublingual tablet Place 1 tablet (0.4 mg) under the tongue every 5 minutes as needed for chest pain 25 tablet 11       ALLERGIES   No Known Allergies    PAST MEDICAL, SURGICAL, FAMILY, SOCIAL HISTORY:  History was reviewed and updated as needed, see medical record.    Review of Systems:  A 12-point review of systems was completed, see medical record for detailed review of systems information.    Physical Exam:  Vitals: BP (!) 140/80   Pulse 80   Ht 1.727 m (5' 8\")   Wt 82.1 kg (181 lb)   BMI 27.52 kg/m      Constitutional:           Skin:           Head:           Eyes:           ENT:           Neck:           Chest:           Cardiac:                    Abdomen:           Vascular:                                        Extremities and Back:           Neurological:           ASSESSMENT: BP still not quite at goal.        RECOMMENDATIONS:    Increase lisinopril to 20 daily   Follow-up with primary care   If bp contol not good, could add diuretic  Follow-up in one year      Recent Lab Results:  LIPID RESULTS:  Lab Results "   Component Value Date    CHOL 133 01/21/2019    HDL 55 01/21/2019    LDL 68.8 01/21/2019    TRIG 46 01/21/2019    CHOLHDLRATIO 2.2 10/29/2015       LIVER ENZYME RESULTS:  Lab Results   Component Value Date    AST 22.0 01/21/2019    ALT 34.0 01/21/2019       CBC RESULTS:  Lab Results   Component Value Date    WBC 9.0 01/21/2019    RBC 4.59 01/21/2019    HGB 14.6 01/21/2019    HCT 44.0 01/21/2019    MCV 95.9 01/21/2019    MCH 31.8 01/21/2019    MCHC 33.2 01/21/2019    RDW 13.6 01/21/2019     01/21/2019     12/05/2012       BMP RESULTS:  Lab Results   Component Value Date     01/21/2019    POTASSIUM 4.4 01/21/2019    CHLORIDE 107 01/21/2019    CO2 25 12/05/2012    ANIONGAP 10 12/05/2012     (A) 01/21/2019    BUN 16 01/21/2019    CR 0.84 01/21/2019    GFRESTIMATED >90 12/05/2012    GFRESTBLACK >90 12/05/2012    TORY 8.7 01/21/2019        A1C RESULTS:  No results found for: A1C    INR RESULTS:  Lab Results   Component Value Date    INR 1.03 12/05/2012       We greatly appreciate the opportunity to be involved in the care of your patient, Nathan Martins.    Sincerely,  Raman Sabillon MD        Kenneth G Pallas, MD  [facility]  [address] [place] [address]

## 2020-01-20 NOTE — PROGRESS NOTES
Service Date: 01/20/2020      CARDIOLOGY CLINIC VISIT NOTE      PRIMARY CARE DOCTOR:  Dr. Pallas      HISTORY OF PRESENT ILLNESS:  Nathan Martins, a 74-year-old man with hypertension, dyslipidemia, and coronary artery disease, was seen today at your request for followup.      Since last seen 1 year ago, Mr. Martins remains asymptomatic.  He has been compliant with medical therapy.  He denies chest, arm, neck, jaw or back discomfort with exertion.      PAST MEDICAL HISTORY:   1.  Hypertension.   2.  Dyslipidemia.   3.  Coronary artery disease.   a.  Abnormal stress test prior to left wrist surgery.   b.  Diagnostic angiography showing distal occlusion of circumflex with good collaterals.  No significant narrowing in dominant right coronary, left main.  Significant narrowing in LAD, treated with bare metal stent.  Repeat coronary angiography 1 year after intervention showing continued patency at LAD stent site and no change in other vessels.      PHYSICAL EXAMINATION:   GENERAL:  Today, demonstrates a very pleasant and cooperative 74-year-old man who looks younger than stated age.   VITAL SIGNS:  His blood pressure is 140/80, his heart rate is 80.  His height is 1.73 meters, his weight is 82.1 kg, his BMI is 27.5.   LUNGS:  Clear to percussion and auscultation.   CARDIOVASCULAR:  Shows a normal S1 with a normal S2.  There is no S3.  There is no murmur, rub or click.  His pulses are full symmetrical in the carotid, radial, brachial, femoral, popliteal, dorsalis pedis, posterior tibial.      LABORATORY STUDIES:  His most recent LDL cholesterol last year was 68.8.  His most recent creatinine was 0.84.      ASSESSMENT:  Mr. Martins is asymptomatic on guideline-directed medical therapy.  His systolic blood pressure exceeds 130 mmHg systolic, the preferred target on the  most recent ACC guidelines.  In addition to lifestyle intervention, I advised that we increase lisinopril from 10 to 20 mg daily.  The patient will  recheck his blood pressure at home and follow up in your office.  If his systolic pressure remains greater than 130, I would add hydrochlorothiazide 12.5 mg daily.      In the meantime, we again reviewed the features of a Mediterranean-style weight loss diet and regular exercise program.      RECOMMENDATIONS:   1.  Mediterranean-style weight loss diet.   2.  Regular exercise program 40 minutes 4-7 times a week.   3.  Increase lisinopril from 10 to 20 mg a day.   4.  The patient will check his blood pressure at home and follow up with his primary care doctor.  If the systolic pressure remains greater than 130, I would advise adding hydrochlorothiazide at 12.5 mg daily.   5.  Follow up in 1 year with me.      We greatly appreciate the opportunity to see your patient, Mr. Nando Martins.      Raman Sabillon MD       cc:   Kenneth Pallas, MD    [facility]    [address] [place] [address]         RAMAN SABILLON MD             D: 2020   T: 2020   MT: DW      Name:     NANDO MARTINS   MRN:      [id number]        Account:      [id number]   :      1945           Service Date: 2020      Document: [id number]

## 2020-01-20 NOTE — LETTER
"1/20/2020    Kenneth G. Pallas, MD  [facility] [address]  [facility] [address]    RE: Nathan Martins       Dear Colleague,    I had the pleasure of seeing Nathan Martins in the [facility].    HISTORY OF PRESENT ILLNESS:  Asymptomatic,     Orders this Visit:  No orders of the defined types were placed in this encounter.    No orders of the defined types were placed in this encounter.    Medications Discontinued During This Encounter   Medication Reason     Cyanocobalamin (VITAMIN B-12 SL)        No diagnosis found.    CURRENT MEDICATIONS:  Current Outpatient Medications   Medication Sig Dispense Refill     aspirin 81 MG tablet Take by mouth daily       atorvastatin (LIPITOR) 80 MG tablet Take 80 mg by mouth daily       carvedilol (COREG) 12.5 MG tablet Take 12.5 mg by mouth 2 times daily (with meals)       cholecalciferol (VITAMIN  -D) 1000 UNITS capsule Take 1 capsule by mouth daily       lisinopril (PRINIVIL,ZESTRIL) 5 MG tablet Take 5 mg by mouth 2 times daily.       Multiple Vitamins-Minerals (CENTRUM SILVER 50+MEN PO) Take by mouth daily       nitroGLYcerin (NITROSTAT) 0.4 MG sublingual tablet Place 1 tablet (0.4 mg) under the tongue every 5 minutes as needed for chest pain 25 tablet 11       ALLERGIES   No Known Allergies    PAST MEDICAL, SURGICAL, FAMILY, SOCIAL HISTORY:  History was reviewed and updated as needed, see medical record.    Review of Systems:  A 12-point review of systems was completed, see medical record for detailed review of systems information.    Physical Exam:  Vitals: BP (!) 140/80   Pulse 80   Ht 1.727 m (5' 8\")   Wt 82.1 kg (181 lb)   BMI 27.52 kg/m       Constitutional:           Skin:           Head:           Eyes:           ENT:           Neck:           Chest:           Cardiac:                    Abdomen:           Vascular:                                        Extremities and Back:           Neurological:       "     ASSESSMENT: BP still not quite at goal.        RECOMMENDATIONS:    Increase lisinopril to 20 daily   Follow-up with primary care   If bp contol not good, could add diuretic  Follow-up in one year      Recent Lab Results:  LIPID RESULTS:  Lab Results   Component Value Date    CHOL 133 01/21/2019    HDL 55 01/21/2019    LDL 68.8 01/21/2019    TRIG 46 01/21/2019    CHOLHDLRATIO 2.2 10/29/2015       LIVER ENZYME RESULTS:  Lab Results   Component Value Date    AST 22.0 01/21/2019    ALT 34.0 01/21/2019       CBC RESULTS:  Lab Results   Component Value Date    WBC 9.0 01/21/2019    RBC 4.59 01/21/2019    HGB 14.6 01/21/2019    HCT 44.0 01/21/2019    MCV 95.9 01/21/2019    MCH 31.8 01/21/2019    MCHC 33.2 01/21/2019    RDW 13.6 01/21/2019     01/21/2019     12/05/2012       BMP RESULTS:  Lab Results   Component Value Date     01/21/2019    POTASSIUM 4.4 01/21/2019    CHLORIDE 107 01/21/2019    CO2 25 12/05/2012    ANIONGAP 10 12/05/2012     (A) 01/21/2019    BUN 16 01/21/2019    CR 0.84 01/21/2019    GFRESTIMATED >90 12/05/2012    GFRESTBLACK >90 12/05/2012    TORY 8.7 01/21/2019        A1C RESULTS:  No results found for: A1C    INR RESULTS:  Lab Results   Component Value Date    INR 1.03 12/05/2012       We greatly appreciate the opportunity to be involved in the care of your patient, Nathan Martins.    Sincerely,  Raman Sabillon MD      CC  Kenneth G Pallas, MD  [facility]  [address] [place] [address]                                                                       Thank you for allowing me to participate in the care of your patient.      Sincerely,     Raman Sabillon MD     [facility]    cc:   Kenneth G Pallas, MD  [facility]  [address] [place] [address]

## 2020-01-20 NOTE — LETTER
1/20/2020      Kenneth G. Pallas, MD  [facility] [address]  [facility] [address]      RE: Nathan Martins       Dear Colleague,    I had the pleasure of seeing Nathan Martins in the [facility].    Service Date: 01/20/2020      CARDIOLOGY CLINIC VISIT NOTE      PRIMARY CARE DOCTOR:  Dr. Pallas      HISTORY OF PRESENT ILLNESS:  Nathan Martins, a 74-year-old man with hypertension, dyslipidemia, and coronary artery disease, was seen today at your request for followup.      Since last seen 1 year ago, Mr. Martins remains asymptomatic.  He has been compliant with medical therapy.  He denies chest, arm, neck, jaw or back discomfort with exertion.      PAST MEDICAL HISTORY:   1.  Hypertension.   2.  Dyslipidemia.   3.  Coronary artery disease.   a.  Abnormal stress test prior to left wrist surgery.   b.  Diagnostic angiography showing distal occlusion of circumflex with good collaterals.  No significant narrowing in dominant right coronary, left main.  Significant narrowing in LAD, treated with bare metal stent.  Repeat coronary angiography 1 year after intervention showing continued patency at LAD stent site and no change in other vessels.      PHYSICAL EXAMINATION:   GENERAL:  Today, demonstrates a very pleasant and cooperative 74-year-old man who looks younger than stated age.   VITAL SIGNS:  His blood pressure is 140/80, his heart rate is 80.  His height is 1.73 meters, his weight is 82.1 kg, his BMI is 27.5.   LUNGS:  Clear to percussion and auscultation.   CARDIOVASCULAR:  Shows a normal S1 with a normal S2.  There is no S3.  There is no murmur, rub or click.  His pulses are full symmetrical in the carotid, radial, brachial, femoral, popliteal, dorsalis pedis, posterior tibial.      LABORATORY STUDIES:  His most recent LDL cholesterol last year was 68.8.  His most recent creatinine was 0.84.      ASSESSMENT:  Mr. Martins is asymptomatic on  guideline-directed medical therapy.  His systolic blood pressure exceeds 130 mmHg systolic, the preferred target on the  most recent ACC guidelines.  In addition to lifestyle intervention, I advised that we increase lisinopril from 10 to 20 mg daily.  The patient will recheck his blood pressure at home and follow up in your office.  If his systolic pressure remains greater than 130, I would add hydrochlorothiazide 12.5 mg daily.      In the meantime, we again reviewed the features of a Mediterranean-style weight loss diet and regular exercise program.      RECOMMENDATIONS:   1.  Mediterranean-style weight loss diet.   2.  Regular exercise program 40 minutes 4-7 times a week.   3.  Increase lisinopril from 10 to 20 mg a day.   4.  The patient will check his blood pressure at home and follow up with his primary care doctor.  If the systolic pressure remains greater than 130, I would advise adding hydrochlorothiazide at 12.5 mg daily.   5.  Follow up in 1 year with me.      We greatly appreciate the opportunity to see your patient, Mr. Nando Martins.      Raman Sabillon MD       cc:   Kenneth Pallas, MD    [facility]    [address] [place] [address]         RAMAN SABILLON MD             D: 2020   T: 2020   MT: DW      Name:     NANDO MARTINS   MRN:      [id number]        Account:      [id number]   :      1945           Service Date: 2020      Document: [id number]           Outpatient Encounter Medications as of 2020   Medication Sig Dispense Refill     aspirin 81 MG tablet Take by mouth daily       atorvastatin (LIPITOR) 80 MG tablet Take 1 tablet (80 mg) by mouth daily 30 tablet 11     carvedilol (COREG) 12.5 MG tablet Take 1 tablet (12.5 mg) by mouth 2 times daily (with meals) 60 tablet 11     cholecalciferol (VITAMIN  -D) 1000 UNITS capsule Take 1 capsule by mouth daily       lisinopril (PRINIVIL/ZESTRIL) 10 MG tablet Take 2 tablets (20 mg)  by mouth 2 times daily 60 tablet 11     Multiple Vitamins-Minerals (CENTRUM SILVER 50+MEN PO) Take by mouth daily       nitroGLYcerin (NITROSTAT) 0.4 MG sublingual tablet Place 1 tablet (0.4 mg) under the tongue every 5 minutes as needed for chest pain 25 tablet 11     [DISCONTINUED] atorvastatin (LIPITOR) 80 MG tablet Take 80 mg by mouth daily       [DISCONTINUED] carvedilol (COREG) 12.5 MG tablet Take 12.5 mg by mouth 2 times daily (with meals)       [DISCONTINUED] Cyanocobalamin (VITAMIN B-12 SL) Place 1 mL under the tongue daily       [DISCONTINUED] lisinopril (PRINIVIL,ZESTRIL) 5 MG tablet Take 5 mg by mouth 2 times daily.       [DISCONTINUED] nitroGLYcerin (NITROSTAT) 0.4 MG sublingual tablet Place 1 tablet (0.4 mg) under the tongue every 5 minutes as needed for chest pain 25 tablet 11     No facility-administered encounter medications on file as of 1/20/2020.        Again, thank you for allowing me to participate in the care of your patient.      Sincerely,    Raman Sabillon MD     [facility]

## 2020-10-06 ENCOUNTER — HOSPITAL ENCOUNTER (EMERGENCY)
Facility: CLINIC | Age: 75
Discharge: HOME | End: 2020-10-06
Attending: EMERGENCY MEDICINE | Admitting: EMERGENCY MEDICINE
Payer: COMMERCIAL

## 2020-10-06 ENCOUNTER — APPOINTMENT (OUTPATIENT)
Dept: GENERAL RADIOLOGY | Facility: CLINIC | Age: 75
End: 2020-10-06
Attending: EMERGENCY MEDICINE
Payer: COMMERCIAL

## 2020-10-06 VITALS
BODY MASS INDEX: 28.14 KG/M2 | WEIGHT: 190 LBS | HEIGHT: 69 IN | OXYGEN SATURATION: 99 % | DIASTOLIC BLOOD PRESSURE: 102 MMHG | RESPIRATION RATE: 16 BRPM | TEMPERATURE: 97.6 F | HEART RATE: 99 BPM | SYSTOLIC BLOOD PRESSURE: 142 MMHG

## 2020-10-06 DIAGNOSIS — S81.812A LACERATION OF LOWER LEG, LEFT, INITIAL ENCOUNTER: Primary | ICD-10-CM

## 2020-10-06 PROCEDURE — 250N000011 HC RX IP 250 OP 636: Performed by: EMERGENCY MEDICINE

## 2020-10-06 PROCEDURE — 99283 EMERGENCY DEPT VISIT LOW MDM: CPT | Mod: 25

## 2020-10-06 PROCEDURE — 12035 INTMD RPR S/A/T/EXT 12.6-20: CPT

## 2020-10-06 PROCEDURE — 90715 TDAP VACCINE 7 YRS/> IM: CPT | Performed by: EMERGENCY MEDICINE

## 2020-10-06 PROCEDURE — 73590 X-RAY EXAM OF LOWER LEG: CPT | Mod: LT

## 2020-10-06 PROCEDURE — 90471 IMMUNIZATION ADMIN: CPT | Performed by: EMERGENCY MEDICINE

## 2020-10-06 PROCEDURE — 90471 IMMUNIZATION ADMIN: CPT

## 2020-10-06 RX ORDER — HYDROCODONE BITARTRATE AND ACETAMINOPHEN 5; 325 MG/1; MG/1
1 TABLET ORAL EVERY 6 HOURS PRN
Qty: 10 TABLET | Refills: 0 | Status: SHIPPED | OUTPATIENT
Start: 2020-10-06 | End: 2020-10-09

## 2020-10-06 RX ORDER — CEPHALEXIN 500 MG/1
500 CAPSULE ORAL 4 TIMES DAILY
Qty: 40 CAPSULE | Refills: 0 | Status: SHIPPED | OUTPATIENT
Start: 2020-10-06 | End: 2020-10-16

## 2020-10-06 RX ORDER — LIDOCAINE HYDROCHLORIDE AND EPINEPHRINE 10; 10 MG/ML; UG/ML
INJECTION, SOLUTION INFILTRATION; PERINEURAL
Status: DISCONTINUED
Start: 2020-10-06 | End: 2020-10-06 | Stop reason: HOSPADM

## 2020-10-06 RX ADMIN — CLOSTRIDIUM TETANI TOXOID ANTIGEN (FORMALDEHYDE INACTIVATED), CORYNEBACTERIUM DIPHTHERIAE TOXOID ANTIGEN (FORMALDEHYDE INACTIVATED), BORDETELLA PERTUSSIS TOXOID ANTIGEN (GLUTARALDEHYDE INACTIVATED), BORDETELLA PERTUSSIS FILAMENTOUS HEMAGGLUTININ ANTIGEN (FORMALDEHYDE INACTIVATED), BORDETELLA PERTUSSIS PERTACTIN ANTIGEN, AND BORDETELLA PERTUSSIS FIMBRIAE 2/3 ANTIGEN 0.5 ML: 5; 2; 2.5; 5; 3; 5 INJECTION, SUSPENSION INTRAMUSCULAR at 14:45

## 2020-10-06 ASSESSMENT — MIFFLIN-ST. JEOR: SCORE: 1587.21

## 2020-10-06 ASSESSMENT — ENCOUNTER SYMPTOMS
WOUND: 1
HEADACHES: 0
BACK PAIN: 0

## 2020-10-06 NOTE — ED AVS SNAPSHOT
[facility]  [address]  [facility] [address]  Phone: [phone]  Fax: [phone]                                    Nathan Martins   MRN: [id number]    Department: [facility]   Date of Visit: 10/6/2020           After Visit Summary Signature Page    I have received my discharge instructions, and my questions have been answered. I have discussed any challenges I see with this plan with the nurse or doctor.    ..........................................................................................................................................  Patient/Patient Representative Signature      ..........................................................................................................................................  Patient Representative Print Name and Relationship to Patient    ..................................................               ................................................  Date                                   Time    ..........................................................................................................................................  Reviewed by Signature/Title    ...................................................              ..............................................  Date                                               Time          22EPIC Rev 08/18

## 2020-10-06 NOTE — DISCHARGE INSTRUCTIONS
Discharge Instructions  Laceration (Cut)    You were seen today for a laceration (cut).  Your provider examined your laceration for any problems such a buried foreign body (like glass, a splinter, or gravel), or injury to blood vessels, tendons, and nerves.  Your provider may have also rinsed and/or scrubbed your laceration to help prevent an infection. It may not be possible to find all problems with your laceration on the first visit; occasionally foreign bodies or a tendon injury can go undetected.    Your laceration may have been closed in one of several ways:  No closure: many wounds will heal just fine without closure.  Stitches: regular stitches that require removal.  Staples: skin staples are often used in the scalp/head.  Wound adhesive (glue): skin glue can be used for certain lacerations and doesn t require removal.  Wound strips (aka Butterfly bandages or steri-strips): these are bandages that help to close a wound.  Absorbable stitches:  dissolving  stitches that go away on their own and usually don t require removal.    A small percentage of wounds will develop an infection regardless of how well the wound is cared for. Antibiotics are generally not indicated to prevent an infection so are only given for a small number of high-risk wounds. Some lacerations are too high risk to close, and are left open to heal because closure can increase the likelihood that an infection will develop.    Remember that all lacerations, no matter how expertly repaired, will cause scarring. We consider many factors, techniques, and materials, in our efforts to provide the best possible cosmetic outcome.    Generally, every Emergency Department visit should have a follow-up clinic visit with either a primary or a specialty clinic/provider. Please follow-up as instructed by your emergency provider today.     Return to the Emergency Department right away if:  You have more redness, swelling, pain, drainage (pus), a bad smell,  or red streaking from your laceration as these symptoms could indicate an infection.  You have a fever of 100.4 F or more.  You have bleeding that you cannot stop at home. If your cut starts to bleed, hold pressure on the bleeding area with a clean cloth or put pressure over the bandage.  If the bleeding does not stop after using constant pressure for 30 minutes, you should return to the Emergency Department for further treatment.  An area past the laceration is cool, pale, or blue compared with the other side, or has a slower return of color when squeezed.  Your dressing seems too tight or starts to get uncomfortable or painful. For children, signs of a problem might be irritability or restlessness.  You have loss of normal function or use of an area, such as being unable to straighten or bend a finger normally.  You have a numb area past the laceration.    Return to the Emergency Department or see your regular provider if:  The laceration starts to come open.   You have something coming out of the cut or a feeling that there is something in the laceration.  Your wound will not heal, or keeps breaking open. There can always be glass, wood, dirt or other things in any wound.  They will not always show up, even on x-rays.  If a wound does not heal, this may be why, and it is important to follow-up with your regular provider.    Home Care:  Take your dressing off in 12-24 hours, or as instructed by your provider, to check your laceration. Remove the dressing sooner if it seems too tight or painful, or if it is getting numb, tingly, or pale past the dressing.  Gently wash your laceration 1-2 times daily with clean water and mild soap. It is okay to shower or run clean water over the laceration, but do not let the laceration soak in water (no swimming).  If your laceration was closed with wound adhesive or strips: pat it dry and leave it open to the air. For all other repairs: after you wash your laceration, or at least  2 times a day, apply antibiotic ointment (such as Neosporin  or Bacitracin ) to the laceration, then cover it with a Band-Aid  or gauze.  Keep the laceration clean. Wear gloves or other protective clothing if you are around dirt.    Follow-up for removal:  If your wound was closed with staples or regular stitches, they need to be removed according to the instructions and timeline specified by your provider today.  If your wound was closed with absorbable ( dissolving ) sutures, they should fall out, dissolve, or not be visible in about one week. If they are still visible, then they should be removed according to the instructions and timeline specified by your provider today.    Scars:  To help minimize scarring:  Wear sunscreen over the healed laceration when out in the sun.  Massage the area regularly once healed.  You may apply Vitamin E to the healed wound.  Wait. Scars improve in appearance over months and years.    If you were given a prescription for medicine here today, be sure to read all of the information (including the package insert) that comes with your prescription.  This will include important information about the medicine, its side effects, and any warnings that you need to know about.  The pharmacist who fills the prescription can provide more information and answer questions you may have about the medicine.  If you have questions or concerns that the pharmacist cannot address, please call or return to the Emergency Department.       Remember that you can always come back to the Emergency Department if you are not able to see your regular provider in the amount of time listed above, if you get any new symptoms, or if there is anything that worries you.    Opioid Medication Information    You have been given a prescription for an opioid (narcotic) pain medicine and/or have received a pain medicine while here in the Emergency Department. These medicines can make you drowsy or impaired. You must not  drive, operate dangerous equipment, or engage in any other dangerous activities while taking these medications. If you drive while taking these medications, you could be arrested for driving under the influence (DUI). Do not drink any alcohol while you are taking these medications.     Opioid pain medications can cause addiction. If you have a history of chemical dependency of any type, you are at a higher risk of becoming addicted to pain medications.  Only take these prescribed medications to treat your pain when all other options have been tried. Take it for as short a time and as few doses as possible. Store your pain pills in a secure place, as they are frequently stolen and provide a dangerous opportunity for children or visitors in your house to start abusing these powerful medications. We will not replace any lost or stolen medicine.    If you do not finish your medication, it is a good idea to get rid of it but please do not flush it down the toilet. Please dispose of the remaining medication at a local pharmacy or law enforcement facility. The [place] [employer]ution [employer] has additional information on medication disposal: https://www.pca.[facility].[place].us/[employer]/managing-unwanted-medications.      Many prescription pain medications contain Tylenol  (acetaminophen), including Vicodin , Tylenol #3 , Norco , Lortab , and Percocet .  You should not take any extra pills of Tylenol  if you are using these prescription medications or you can get very sick.  Do not ever take more than 3000 mg of acetaminophen in any 24 hour period.    All opioids tend to cause constipation. Drink plenty of water and eat foods that have a lot of fiber, such as fruits, vegetables, prune juice, apple juice and high fiber cereal.  Take a laxative if you don t move your bowels at least every other day. Miralax , Milk of Magnesia, Colace , or Senna  can be used to keep you regular.

## 2020-10-06 NOTE — ED PROVIDER NOTES
"  History   Chief Complaint:  Laceration    HPI  Nathan Martins is a 75 year old male who presents for evaluation of a left leg laceration. He reports he tripped over a lawmower bag PTA and cut open his lower left leg. He now has a large laceration to his shin. He notes the pain is currently 3/10 in severity. He denies left knee or ankle pain. He also denies hitting his head or any other injury. Denies back pain. His last tetanus was in 2014.    Allergies:  No Known Allergies    Medications:    Lipitor  Nitrostat    Past Medical History:    Arthritis  Cardiomyopathy  CAD  Dyslipidemia  Hypertension    Past Surgical History:    Angioplasty  Cataract removal  Right elbow surgery   Osteotomy wrist     Family History:    Pancreatic cancer     Social History:  Marital Status:   Presents with wife at bedside  Tobacco use: Former smoker  Alcohol use: Yes  Drug use: No    Review of Systems   Musculoskeletal: Negative for back pain.        Denies left knee or ankle pain   Skin: Positive for wound.   Neurological: Negative for headaches.   All other systems reviewed and are negative.    Physical Exam     Patient Vitals for the past 24 hrs:   BP Temp Temp src Pulse Resp SpO2 Height Weight   10/06/20 1422 (!) 142/102 97.6  F (36.4  C) Temporal 99 16 99 % 1.753 m (5' 9\") 86.2 kg (190 lb)       Physical Exam  General: Alert, appears well-developed and well-nourished. Cooperative.     In mild distress  HEENT:  Head:  Atraumatic  Ears:  External ears are normal  Mouth/Throat:  Oropharynx is without erythema or exudate and mucous membranes are moist.   Eyes:   Conjunctivae normal and EOM are normal. No scleral icterus.  CV:  Normal rate, regular rhythm, normal heart sounds and radial pulses are 2+ and symmetric.  No murmur.  Resp:  Breath sounds are clear bilaterally    Non-labored, no retractions or accessory muscle use  GI:  Abdomen is soft, no distension, no tenderness. No rebound or guarding.  No CVA tenderness " bilaterally  MS:  Normal range of motion. No edema.    Normal strength in all 4 extremities.     Back atraumatic.    No midline cervical, thoracic, or lumbar tenderness  Skin:  Warm and dry.  Large laceration to anterior left lower leg.  Neuro:  Alert. Normal strength.  GCS: 15  Psych:  Normal mood and affect.            Emergency Department Course     Imaging:  Radiology findings were communicated with the patient who voiced understanding of the findings.    XR Tibia and Fibula Left 2 Views:  No acute bony abnormality. Soft tissue disruption lateral   calf region consistent with known laceration. No radiopaque foreign   bodies identified. Vascular calcifications are seen, as per radiology.     Procedures    Laceration Repair        LACERATION:  A subcutaneous and complex mildly contaminated 15.2 cm laceration.      LOCATION:  Left shin      ANESTHESIA:  Local using Lidocaine 1% w/ Epi, 15cc      PREPARATION:  Irrigation and Scrubbing with Normal Saline and Shur Clens      DEBRIDEMENT:  No debridement      CLOSURE:  Wound was closed with One Layer.  Skin closed with 15 x 3.0 Ethylon using 3 horizontal mattress, 2 corner sutures, and 10 simple interrupted sutures.    Interventions:  1445: Tdap, 0.5 mL, Intramuscular    Emergency Department Course:  Past medical records, nursing notes, and vitals reviewed.    1425: I performed an exam of the patient as documented above.     The patient was sent for a XR while in the emergency department, results above.     1444:   I spoke with ASHLEIGH Toney of the Ortho service regarding patient's presentation, findings, and plan of care.    1645: I rechecked the patient and performed a laceration repair.    Findings and plan explained to the Patient. Patient discharged home with instructions regarding supportive care, medications, and reasons to return. The importance of close follow-up was reviewed. The patient was prescribed Keflex and Norco.    I personally reviewed the  imaging results with the Patient and answered all related questions prior to discharge.     Impression & Plan     Medical Decision Making:  Nathan Martins is a 75 year old male presented to the Emergency Department with a laceration.  Given the time of the injury, the wound was amenable to primary closure.  After adequate anesthesia was obtained, the wound was thoroughly irrigated and examined.  There is no evidence of muscular, tendon, or bony involvement at this time, nor signs or symptoms of neurovascular compromise.  X-ray negative for foreign body.  Significant wound washout done at bedside prior to repair.  Wound was repaired as outlined above in the procedure note.    We discussed appropriate wound care instructions including keeping the wound dry for the first 24-48 hours, followed be gentle cleansing with soap and water.  We discussed application of sunscreen to the affected area once scar has formed, to minimize long term scar formation.  Warning signs of infection (erythema, warmth, worsening pain, drainage of pus) were discussed, which should prompt return to the ER for re-evaluation and the patient verbalized understanding.  Will plan for suture/staple removal in 14 days.  Prior to then patient will have follow-up in 7 days with his primary care provider for wound recheck to ensure that there is no poor wound healing or devascularized tissue that may require plastic surgery consultation.  Patient was encouraged to return to the ER or follow-up with PCP in the meantime should any new or troubling symptoms develop.     Diagnosis:    ICD-10-CM    1. Laceration of lower leg, left, initial encounter  S81.812A        Disposition:  Discharged to home.    Discharge Medications:  Discharge Medication List as of 10/6/2020  4:55 PM      START taking these medications    Details   cephALEXin (KEFLEX) 500 MG capsule Take 1 capsule (500 mg) by mouth 4 times daily for 10 days, Disp-40 capsule, R-0, Local Print       HYDROcodone-acetaminophen (NORCO) 5-325 MG tablet Take 1 tablet by mouth every 6 hours as needed for severe pain (not improved with ibuprofen alone), Disp-10 tablet, R-0, Local Print             Scribe Disclosure:  I, Shayy Brito, am serving as a scribe at 2:33 PM on 10/6/2020 to document services personally performed by Nirmal Adams MD based on my observations and the provider's statements to me.      Nirmal Adams MD  10/06/20 1181

## 2020-10-06 NOTE — ED TRIAGE NOTES
Pt tripped over the lawnmower bag and cut open the left lower leg.  Large laceration to the left leg.  Unknown tetanus status

## 2021-01-14 ENCOUNTER — OFFICE VISIT (OUTPATIENT)
Dept: CARDIOLOGY | Facility: CLINIC | Age: 76
End: 2021-01-14
Attending: INTERNAL MEDICINE
Payer: COMMERCIAL

## 2021-01-14 VITALS
WEIGHT: 177 LBS | HEIGHT: 68 IN | SYSTOLIC BLOOD PRESSURE: 130 MMHG | HEART RATE: 80 BPM | BODY MASS INDEX: 26.83 KG/M2 | DIASTOLIC BLOOD PRESSURE: 84 MMHG

## 2021-01-14 DIAGNOSIS — I25.10 CORONARY ARTERY DISEASE INVOLVING NATIVE CORONARY ARTERY OF NATIVE HEART WITHOUT ANGINA PECTORIS: ICD-10-CM

## 2021-01-14 DIAGNOSIS — I10 ESSENTIAL HYPERTENSION: ICD-10-CM

## 2021-01-14 PROCEDURE — 99213 OFFICE O/P EST LOW 20 MIN: CPT | Performed by: INTERNAL MEDICINE

## 2021-01-14 RX ORDER — CARVEDILOL 12.5 MG/1
12.5 TABLET ORAL 2 TIMES DAILY WITH MEALS
Qty: 60 TABLET | Refills: 11 | Status: SHIPPED | OUTPATIENT
Start: 2021-01-14 | End: 2021-01-26

## 2021-01-14 RX ORDER — NITROGLYCERIN 0.4 MG/1
0.4 TABLET SUBLINGUAL EVERY 5 MIN PRN
Qty: 25 TABLET | Refills: 11 | Status: SHIPPED | OUTPATIENT
Start: 2021-01-14 | End: 2022-05-06

## 2021-01-14 RX ORDER — LISINOPRIL 20 MG/1
20 TABLET ORAL DAILY
Qty: 90 TABLET | Refills: 11 | Status: SHIPPED | OUTPATIENT
Start: 2021-01-14 | End: 2021-09-09

## 2021-01-14 RX ORDER — ATORVASTATIN CALCIUM 80 MG/1
80 TABLET, FILM COATED ORAL DAILY
Qty: 30 TABLET | Refills: 11 | Status: SHIPPED | OUTPATIENT
Start: 2021-01-14 | End: 2022-01-18

## 2021-01-14 ASSESSMENT — MIFFLIN-ST. JEOR: SCORE: 1512.37

## 2021-01-14 NOTE — PROGRESS NOTES
Service Date: 01/14/2021      CARDIOLOGY CLINIC VISIT NOTE      HISTORY OF PRESENT ILLNESS:  Nathan Martins, a 75-year-old man with hypertension, dyslipidemia and coronary artery disease, was seen today at his request for followup.      Since I last saw the patient, he informs me his primary care physician, Dr. Pallas, plans to retire.  He is seeking an alternate primary care doctor.  The patient denies any new cardiovascular symptoms.  He specifically denies chest, arm, neck, jaw or back discomfort with exertion, syncope, lightheadedness or dyspnea.      PAST MEDICAL HISTORY:   1.  Hypertension.   2.  Dyslipidemia.   3.  Coronary artery disease.   a.  Abnormal stress test prior to left wrist surgery.   b.  Diagnostic angiography showing distal occlusion of circumflex with good collaterals.  No significant narrowing in dominant right coronary or left main.  Significant narrowing in LAD treated with a bare-metal stent.  Repeat coronary angiography in 2015 showing continued patency at LAD intervention site with no change in other coronary vessels.      PHYSICAL EXAMINATION:     GENERAL:  Exam today demonstrates a very pleasant, cooperative and intelligent, 75-year-old man.     VITAL SIGNS:  His blood pressure is 130/84.  His heart rate is 80.  His height is 1.73 m.  His weight is 80.3 kg.  His BMI is 26.   LUNGS:  Clear to percussion and auscultation.   CARDIOVASCULAR:  Normal S1 with a normal S2.  There is no S3.  There is no murmur, rub or click.   EXTREMITIES:  His pulses are full and symmetrical.      MEDICATIONS:   1.  Aspirin 81 daily.   2.  Atorvastatin 80 mg daily.   3.  Carvedilol 12.5 b.i.d.   4.  Lisinopril 20 mg daily.   5.  Nitroglycerin sublingual p.r.n.      ASSESSMENT:  I am pleased Mr. Martins remains asymptomatic with optimal systolic blood pressure and LDL cholesterol levels.  He will need to obtain a primary care physician to provide primary care.      I recommended that when he sees that primary  care doctor, he should have laboratories drawn and the results sent to our office.      RECOMMENDATIONS:   1.  Mediterranean-style diet.   2.  Regular exercise program.   3.  Continue present medical therapy (prescriptions refilled today).   4.  Follow up with me in about 1 year.   5.  Obtain primary care doctor.      We greatly appreciate the opportunity to participate in the care of your patient Mr. Nando Martins.         FOREST KIM MD             D: 2021   T: 2021   MT: brianna      Name:     NANDO MARTINS   MRN:      [id number]        Account:      [id number]   :      1945           Service Date: 2021      Document: [id number]

## 2021-01-14 NOTE — LETTER
1/14/2021      Kenneth G. Pallas, MD  [facility] [address]  [facility] [address]      RE: Nathan Martins       Dear Colleague,    I had the pleasure of seeing Nathan Martins in the [facility].    Service Date: 01/14/2021      CARDIOLOGY CLINIC VISIT NOTE      HISTORY OF PRESENT ILLNESS:  Nathan Martins, a 75-year-old man with hypertension, dyslipidemia and coronary artery disease, was seen today at his request for followup.      Since I last saw the patient, he informs me his primary care physician, Dr. Pallas, plans to retire.  He is seeking an alternate primary care doctor.  The patient denies any new cardiovascular symptoms.  He specifically denies chest, arm, neck, jaw or back discomfort with exertion, syncope, lightheadedness or dyspnea.      PAST MEDICAL HISTORY:   1.  Hypertension.   2.  Dyslipidemia.   3.  Coronary artery disease.   a.  Abnormal stress test prior to left wrist surgery.   b.  Diagnostic angiography showing distal occlusion of circumflex with good collaterals.  No significant narrowing in dominant right coronary or left main.  Significant narrowing in LAD treated with a bare-metal stent.  Repeat coronary angiography in 2015 showing continued patency at LAD intervention site with no change in other coronary vessels.      PHYSICAL EXAMINATION:     GENERAL:  Exam today demonstrates a very pleasant, cooperative and intelligent, 75-year-old man.     VITAL SIGNS:  His blood pressure is 130/84.  His heart rate is 80.  His height is 1.73 m.  His weight is 80.3 kg.  His BMI is 26.   LUNGS:  Clear to percussion and auscultation.   CARDIOVASCULAR:  Normal S1 with a normal S2.  There is no S3.  There is no murmur, rub or click.   EXTREMITIES:  His pulses are full and symmetrical.      MEDICATIONS:   1.  Aspirin 81 daily.   2.  Atorvastatin 80 mg daily.   3.  Carvedilol 12.5 b.i.d.   4.  Lisinopril 20 mg daily.   5.  Nitroglycerin sublingual  p.r.n.      ASSESSMENT:  I am pleased Mr. Martins remains asymptomatic with optimal systolic blood pressure and LDL cholesterol levels.  He will need to obtain a primary care physician to provide primary care.      I recommended that when he sees that primary care doctor, he should have laboratories drawn and the results sent to our office.      RECOMMENDATIONS:   1.  Mediterranean-style diet.   2.  Regular exercise program.   3.  Continue present medical therapy (prescriptions refilled today).   4.  Follow up with me in about 1 year.   5.  Obtain primary care doctor.      We greatly appreciate the opportunity to participate in the care of your patient Mr. Nando Martins.         FOREST KIM MD             D: 2021   T: 2021   MT: brianna      Name:     NANDO MARTINS   MRN:      [id number]        Account:      [id number]   :      1945           Service Date: 2021      Document: [id number]            Outpatient Encounter Medications as of 2021   Medication Sig Dispense Refill     aspirin 81 MG tablet Take by mouth daily       atorvastatin (LIPITOR) 80 MG tablet Take 1 tablet (80 mg) by mouth daily 30 tablet 11     carvedilol (COREG) 12.5 MG tablet Take 1 tablet (12.5 mg) by mouth 2 times daily (with meals) 60 tablet 11     cholecalciferol (VITAMIN  -D) 1000 UNITS capsule Take 1 capsule by mouth daily       lisinopril (ZESTRIL) 20 MG tablet Take 1 tablet (20 mg) by mouth daily 90 tablet 11     Multiple Vitamins-Minerals (CENTRUM SILVER 50+MEN PO) Take by mouth daily       nitroGLYcerin (NITROSTAT) 0.4 MG sublingual tablet Place 1 tablet (0.4 mg) under the tongue every 5 minutes as needed for chest pain 25 tablet 11     [DISCONTINUED] atorvastatin (LIPITOR) 80 MG tablet Take 1 tablet (80 mg) by mouth daily 30 tablet 11     [DISCONTINUED] carvedilol (COREG) 12.5 MG tablet Take 1 tablet (12.5 mg) by mouth 2 times daily (with meals) 60 tablet 11     [DISCONTINUED] lisinopril  (PRINIVIL/ZESTRIL) 10 MG tablet Take 2 tablets (20 mg) by mouth 2 times daily (Patient taking differently: Take 10 mg by mouth 2 times daily ) 60 tablet 11     [DISCONTINUED] nitroGLYcerin (NITROSTAT) 0.4 MG sublingual tablet Place 1 tablet (0.4 mg) under the tongue every 5 minutes as needed for chest pain 25 tablet 11     No facility-administered encounter medications on file as of 1/14/2021.        Again, thank you for allowing me to participate in the care of your patient.      Sincerely,    Raman Sabillon MD     [facility]

## 2021-01-14 NOTE — LETTER
1/14/2021    Kenneth G. Pallas, MD  [facility] [address]  [facility] [address]    RE: Nathan Martins       Dear Colleague,    I had the pleasure of seeing Nathan Martins in the [facility].    HISTORY OF PRESENT ILLNESS:  Hurt left leg several months again   BPs are well controlled always less 130 at home. Walks dog 2 ot 3 times. Can work at home.     Orders this Visit:  No orders of the defined types were placed in this encounter.    Orders Placed This Encounter   Medications     lisinopril (ZESTRIL) 20 MG tablet     Sig: Take 1 tablet (20 mg) by mouth daily     Dispense:  90 tablet     Refill:  11     carvedilol (COREG) 12.5 MG tablet     Sig: Take 1 tablet (12.5 mg) by mouth 2 times daily (with meals)     Dispense:  60 tablet     Refill:  11     atorvastatin (LIPITOR) 80 MG tablet     Sig: Take 1 tablet (80 mg) by mouth daily     Dispense:  30 tablet     Refill:  11     nitroGLYcerin (NITROSTAT) 0.4 MG sublingual tablet     Sig: Place 1 tablet (0.4 mg) under the tongue every 5 minutes as needed for chest pain     Dispense:  25 tablet     Refill:  11     Medications Discontinued During This Encounter   Medication Reason     nitroGLYcerin (NITROSTAT) 0.4 MG sublingual tablet Reorder     lisinopril (PRINIVIL/ZESTRIL) 10 MG tablet Reorder     carvedilol (COREG) 12.5 MG tablet Reorder     atorvastatin (LIPITOR) 80 MG tablet Reorder       Encounter Diagnoses   Name Primary?     Coronary artery disease involving native coronary artery of native heart without angina pectoris      Essential hypertension        CURRENT MEDICATIONS:  Current Outpatient Medications   Medication Sig Dispense Refill     aspirin 81 MG tablet Take by mouth daily       atorvastatin (LIPITOR) 80 MG tablet Take 1 tablet (80 mg) by mouth daily 30 tablet 11     carvedilol (COREG) 12.5 MG tablet Take 1 tablet (12.5 mg) by mouth 2 times daily (with meals) 60 tablet 11     cholecalciferol  "(VITAMIN  -D) 1000 UNITS capsule Take 1 capsule by mouth daily       lisinopril (ZESTRIL) 20 MG tablet Take 1 tablet (20 mg) by mouth daily 90 tablet 11     Multiple Vitamins-Minerals (CENTRUM SILVER 50+MEN PO) Take by mouth daily       nitroGLYcerin (NITROSTAT) 0.4 MG sublingual tablet Place 1 tablet (0.4 mg) under the tongue every 5 minutes as needed for chest pain 25 tablet 11       ALLERGIES   No Known Allergies    PAST MEDICAL, SURGICAL, FAMILY, SOCIAL HISTORY:  History was reviewed and updated as needed, see medical record.    Review of Systems:  A 12-point review of systems was completed, see medical record for detailed review of systems information.    Physical Exam:  Vitals: BP (!) 148/84   Pulse 80   Ht 1.727 m (5' 8\")   Wt 80.3 kg (177 lb)   BMI 26.91 kg/m      Constitutional:           Skin:           Head:           Eyes:           ENT:           Neck:           Chest:           Cardiac:                    Abdomen:           Vascular:                                        Extremities and Back:           Neurological:           ASSESSMENT: stable       RECOMMENDATIONS:   Follow-up in one year  Obtain primary care MD bloods with him/her      Recent Lab Results:  LIPID RESULTS:  Lab Results   Component Value Date    CHOL 133 01/21/2019    HDL 55 01/21/2019    LDL 68.8 01/21/2019    TRIG 46 01/21/2019    CHOLHDLRATIO 2.2 10/29/2015       LIVER ENZYME RESULTS:  Lab Results   Component Value Date    AST 22.0 01/21/2019    ALT 34.0 01/21/2019       CBC RESULTS:  Lab Results   Component Value Date    WBC 9.0 01/21/2019    RBC 4.59 01/21/2019    HGB 14.6 01/21/2019    HCT 44.0 01/21/2019    MCV 95.9 01/21/2019    MCH 31.8 01/21/2019    MCHC 33.2 01/21/2019    RDW 13.6 01/21/2019     01/21/2019     12/05/2012       BMP RESULTS:  Lab Results   Component Value Date     01/21/2019    POTASSIUM 4.4 01/21/2019    CHLORIDE 107 01/21/2019    CO2 25 12/05/2012    ANIONGAP 10 12/05/2012     " (A) 01/21/2019    BUN 16 01/21/2019    CR 0.84 01/21/2019    GFRESTIMATED >90 12/05/2012    GFRESTBLACK >90 12/05/2012    TORY 8.7 01/21/2019        A1C RESULTS:  No results found for: A1C    INR RESULTS:  Lab Results   Component Value Date    INR 1.03 12/05/2012       We greatly appreciate the opportunity to be involved in the care of your patient, Nathan Martins.    Sincerely,  Raman Sabillon MD      CC  Raman Sabillon MD  [id number] [place] [id number]                                                                         Thank you for allowing me to participate in the care of your patient.      Sincerely,     Raman Sabillon MD     [facility]    cc:   Raman Sabillon MD  [id number] [id number]

## 2021-01-14 NOTE — PROGRESS NOTES
HISTORY OF PRESENT ILLNESS:  Hurt left leg several months again   BPs are well controlled always less 130 at home. Walks dog 2 ot 3 times. Can work at home.     Orders this Visit:  No orders of the defined types were placed in this encounter.    Orders Placed This Encounter   Medications     lisinopril (ZESTRIL) 20 MG tablet     Sig: Take 1 tablet (20 mg) by mouth daily     Dispense:  90 tablet     Refill:  11     carvedilol (COREG) 12.5 MG tablet     Sig: Take 1 tablet (12.5 mg) by mouth 2 times daily (with meals)     Dispense:  60 tablet     Refill:  11     atorvastatin (LIPITOR) 80 MG tablet     Sig: Take 1 tablet (80 mg) by mouth daily     Dispense:  30 tablet     Refill:  11     nitroGLYcerin (NITROSTAT) 0.4 MG sublingual tablet     Sig: Place 1 tablet (0.4 mg) under the tongue every 5 minutes as needed for chest pain     Dispense:  25 tablet     Refill:  11     Medications Discontinued During This Encounter   Medication Reason     nitroGLYcerin (NITROSTAT) 0.4 MG sublingual tablet Reorder     lisinopril (PRINIVIL/ZESTRIL) 10 MG tablet Reorder     carvedilol (COREG) 12.5 MG tablet Reorder     atorvastatin (LIPITOR) 80 MG tablet Reorder       Encounter Diagnoses   Name Primary?     Coronary artery disease involving native coronary artery of native heart without angina pectoris      Essential hypertension        CURRENT MEDICATIONS:  Current Outpatient Medications   Medication Sig Dispense Refill     aspirin 81 MG tablet Take by mouth daily       atorvastatin (LIPITOR) 80 MG tablet Take 1 tablet (80 mg) by mouth daily 30 tablet 11     carvedilol (COREG) 12.5 MG tablet Take 1 tablet (12.5 mg) by mouth 2 times daily (with meals) 60 tablet 11     cholecalciferol (VITAMIN  -D) 1000 UNITS capsule Take 1 capsule by mouth daily       lisinopril (ZESTRIL) 20 MG tablet Take 1 tablet (20 mg) by mouth daily 90 tablet 11     Multiple Vitamins-Minerals (CENTRUM SILVER 50+MEN PO) Take by mouth daily       nitroGLYcerin  "(NITROSTAT) 0.4 MG sublingual tablet Place 1 tablet (0.4 mg) under the tongue every 5 minutes as needed for chest pain 25 tablet 11       ALLERGIES   No Known Allergies    PAST MEDICAL, SURGICAL, FAMILY, SOCIAL HISTORY:  History was reviewed and updated as needed, see medical record.    Review of Systems:  A 12-point review of systems was completed, see medical record for detailed review of systems information.    Physical Exam:  Vitals: BP (!) 148/84   Pulse 80   Ht 1.727 m (5' 8\")   Wt 80.3 kg (177 lb)   BMI 26.91 kg/m      Constitutional:           Skin:           Head:           Eyes:           ENT:           Neck:           Chest:           Cardiac:                    Abdomen:           Vascular:                                        Extremities and Back:           Neurological:           ASSESSMENT: stable       RECOMMENDATIONS:   Follow-up in one year  Obtain primary care MD bloods with him/her      Recent Lab Results:  LIPID RESULTS:  Lab Results   Component Value Date    CHOL 133 01/21/2019    HDL 55 01/21/2019    LDL 68.8 01/21/2019    TRIG 46 01/21/2019    CHOLHDLRATIO 2.2 10/29/2015       LIVER ENZYME RESULTS:  Lab Results   Component Value Date    AST 22.0 01/21/2019    ALT 34.0 01/21/2019       CBC RESULTS:  Lab Results   Component Value Date    WBC 9.0 01/21/2019    RBC 4.59 01/21/2019    HGB 14.6 01/21/2019    HCT 44.0 01/21/2019    MCV 95.9 01/21/2019    MCH 31.8 01/21/2019    MCHC 33.2 01/21/2019    RDW 13.6 01/21/2019     01/21/2019     12/05/2012       BMP RESULTS:  Lab Results   Component Value Date     01/21/2019    POTASSIUM 4.4 01/21/2019    CHLORIDE 107 01/21/2019    CO2 25 12/05/2012    ANIONGAP 10 12/05/2012     (A) 01/21/2019    BUN 16 01/21/2019    CR 0.84 01/21/2019    GFRESTIMATED >90 12/05/2012    GFRESTBLACK >90 12/05/2012    TORY 8.7 01/21/2019        A1C RESULTS:  No results found for: A1C    INR RESULTS:  Lab Results   Component Value Date    INR " 1.03 12/05/2012       We greatly appreciate the opportunity to be involved in the care of your patient, Nathan Martins.    Sincerely,  Raman Sabillon MD      CC  Raman Sabillon MD  [address]  [place] [id number]

## 2021-01-26 DIAGNOSIS — I10 ESSENTIAL HYPERTENSION: ICD-10-CM

## 2021-01-26 DIAGNOSIS — I25.10 CORONARY ARTERY DISEASE INVOLVING NATIVE CORONARY ARTERY OF NATIVE HEART WITHOUT ANGINA PECTORIS: ICD-10-CM

## 2021-01-26 RX ORDER — CARVEDILOL 12.5 MG/1
12.5 TABLET ORAL 2 TIMES DAILY WITH MEALS
Qty: 180 TABLET | Refills: 3 | Status: SHIPPED | OUTPATIENT
Start: 2021-01-26 | End: 2022-01-18

## 2021-01-26 NOTE — TELEPHONE ENCOUNTER
Medication Refilled: Coreg - 90 day refill request fulfillment  Last office visit: 1/14/2021 with Dr. ren  Last Labs/EKG: NA  Next office visit: 1/2022 orders in [facility]  Pharmacy sent to: Keegan Wilson RN

## 2021-09-03 ENCOUNTER — HOSPITAL ENCOUNTER (EMERGENCY)
Facility: CLINIC | Age: 76
Discharge: HOME | End: 2021-09-03
Attending: EMERGENCY MEDICINE | Admitting: EMERGENCY MEDICINE
Payer: COMMERCIAL

## 2021-09-03 ENCOUNTER — APPOINTMENT (OUTPATIENT)
Dept: GENERAL RADIOLOGY | Facility: CLINIC | Age: 76
End: 2021-09-03
Attending: EMERGENCY MEDICINE
Payer: COMMERCIAL

## 2021-09-03 VITALS
TEMPERATURE: 97.9 F | WEIGHT: 175.71 LBS | DIASTOLIC BLOOD PRESSURE: 82 MMHG | SYSTOLIC BLOOD PRESSURE: 154 MMHG | BODY MASS INDEX: 26.72 KG/M2 | HEART RATE: 62 BPM | OXYGEN SATURATION: 97 % | RESPIRATION RATE: 20 BRPM

## 2021-09-03 DIAGNOSIS — R06.02 SHORTNESS OF BREATH: ICD-10-CM

## 2021-09-03 LAB
ANION GAP SERPL CALCULATED.3IONS-SCNC: 3 MMOL/L (ref 3–14)
BASOPHILS # BLD AUTO: 0 10E3/UL (ref 0–0.2)
BASOPHILS NFR BLD AUTO: 1 %
BUN SERPL-MCNC: 11 MG/DL (ref 7–30)
CALCIUM SERPL-MCNC: 8.5 MG/DL (ref 8.5–10.1)
CHLORIDE BLD-SCNC: 109 MMOL/L (ref 94–109)
CO2 SERPL-SCNC: 27 MMOL/L (ref 20–32)
CREAT SERPL-MCNC: 0.91 MG/DL (ref 0.66–1.25)
EOSINOPHIL # BLD AUTO: 0.4 10E3/UL (ref 0–0.7)
EOSINOPHIL NFR BLD AUTO: 4 %
ERYTHROCYTE [DISTWIDTH] IN BLOOD BY AUTOMATED COUNT: 13.3 % (ref 10–15)
GFR SERPL CREATININE-BSD FRML MDRD: 82 ML/MIN/1.73M2
GLUCOSE BLD-MCNC: 100 MG/DL (ref 70–99)
HCT VFR BLD AUTO: 43.4 % (ref 40–53)
HGB BLD-MCNC: 14.6 G/DL (ref 13.3–17.7)
HOLD SPECIMEN: NORMAL
HOLD SPECIMEN: NORMAL
IMM GRANULOCYTES # BLD: 0 10E3/UL
IMM GRANULOCYTES NFR BLD: 0 %
LYMPHOCYTES # BLD AUTO: 2.1 10E3/UL (ref 0.8–5.3)
LYMPHOCYTES NFR BLD AUTO: 25 %
MCH RBC QN AUTO: 32.6 PG (ref 26.5–33)
MCHC RBC AUTO-ENTMCNC: 33.6 G/DL (ref 31.5–36.5)
MCV RBC AUTO: 97 FL (ref 78–100)
MONOCYTES # BLD AUTO: 0.8 10E3/UL (ref 0–1.3)
MONOCYTES NFR BLD AUTO: 9 %
NEUTROPHILS # BLD AUTO: 5.2 10E3/UL (ref 1.6–8.3)
NEUTROPHILS NFR BLD AUTO: 61 %
NRBC # BLD AUTO: 0 10E3/UL
NRBC BLD AUTO-RTO: 0 /100
PLATELET # BLD AUTO: 223 10E3/UL (ref 150–450)
POTASSIUM BLD-SCNC: 4.1 MMOL/L (ref 3.4–5.3)
RBC # BLD AUTO: 4.48 10E6/UL (ref 4.4–5.9)
SODIUM SERPL-SCNC: 139 MMOL/L (ref 133–144)
TROPONIN I SERPL-MCNC: <0.015 UG/L (ref 0–0.04)
WBC # BLD AUTO: 8.6 10E3/UL (ref 4–11)

## 2021-09-03 PROCEDURE — 93005 ELECTROCARDIOGRAM TRACING: CPT

## 2021-09-03 PROCEDURE — 84484 ASSAY OF TROPONIN QUANT: CPT | Performed by: EMERGENCY MEDICINE

## 2021-09-03 PROCEDURE — 71046 X-RAY EXAM CHEST 2 VIEWS: CPT

## 2021-09-03 PROCEDURE — 85025 COMPLETE CBC W/AUTO DIFF WBC: CPT | Performed by: EMERGENCY MEDICINE

## 2021-09-03 PROCEDURE — 99285 EMERGENCY DEPT VISIT HI MDM: CPT | Mod: 25

## 2021-09-03 PROCEDURE — 80048 BASIC METABOLIC PNL TOTAL CA: CPT | Performed by: EMERGENCY MEDICINE

## 2021-09-03 PROCEDURE — 36415 COLL VENOUS BLD VENIPUNCTURE: CPT | Performed by: EMERGENCY MEDICINE

## 2021-09-03 ASSESSMENT — ENCOUNTER SYMPTOMS
VOMITING: 0
NAUSEA: 0
SHORTNESS OF BREATH: 1

## 2021-09-03 NOTE — ED NOTES
Pt's wife told several nurses she was ready to leave and demanded IV be removed. It was suggested to pt and wife several times that they stay to at least speak with the physician but they declined and walked out.

## 2021-09-03 NOTE — ED PROVIDER NOTES
History   Chief Complaint:  Shortness of Breath      HPI  Nathan Martins is a 76 year old male with history of cardiomyopathy, hypertension, dyslipidemia, and coronary artery disease who presents after two short lived episodes of dyspnea.  Nathan was lifting a ~30lb board this afternoon with his wife when he found that he was unable to catch his breath, and was flushed, which lasted about 5 minutes. Then about 45 minutes later he was lifting the same board with his wife when he experienced another 5-minute episode of the same symptoms, so he presents to the ED. His wife reports his face also looked clammy after the episodes. He reports he has been able to play golf and go for long walks with his dog daily without any symptoms. His walk this morning was normal. He denies other symptoms, specifically denying nausea, vomiting, near syncope/dizziness, chest pain, leg swelling, or swelling elsewhere in the body. He also denies having history of lung issues. He does not smoke but is a former smoker. He denies any other symptoms. He reports he is completely asymptomatic at this time. No recent cough, pleuritic pain, or fevers.     Review of Systems   Constitutional: Negative for chills, fatigue and fever.   Respiratory: Positive for shortness of breath.    Cardiovascular: Negative for chest pain and leg swelling.   Gastrointestinal: Negative for nausea and vomiting.   Neurological: Negative for syncope.   All other systems reviewed and are negative.    Allergies:  The patient has no known allergies.     Medications:  Aspirin 81 mg  Atorvastatin  Carvedilol  Lisinopril  Nitroglycerin    Past Medical History:  Arthritis  Cardiomyopathy  Coronary artery disease  Dyslipidemia  Stented coronary artery  Hypertension  Coronary artery disease  Left wrist fracture     Past Surgical History:    Angioplasty  Cataract  Right elbow surgery  Wrist osteotomy     Family History:    Pancreatic cancer  Diabetes    Social  History:  Walks the dog twice a day.  Golfs twice a week.  PCP (auto-populated): Kenneth G. Pallas      Physical Exam     Patient Vitals for the past 24 hrs:   BP Temp Temp src Pulse Resp SpO2 Weight   09/03/21 1700 (!) 154/82 -- -- 62 -- 97 % --   09/03/21 1645 (!) 149/85 -- -- 59 -- 99 % --   09/03/21 1427 (!) 156/93 97.9  F (36.6  C) Oral 73 20 96 % 79.7 kg (175 lb 11.3 oz)       Physical Exam  General: Well appearing, nontoxic.  Resting comfortably  Head:  Scalp, face, and head appear normal  Eyes:  Pupils are equal, round    Conjunctivae non-injected and sclerae white  ENT:    The external nose is normal    Pinnae are normal  Neck:  Normal range of motion    There is no rigidity noted    Trachea is in the midline  CV:  Regular rate and rhythm     Normal S1/S2, no S3/S4    No murmur or rub. Radial pulses 2+ bilaterally.  Resp:  Lungs are clear and equal bilaterally  There is no tachypnea    No increased work of breathing    No rales, wheezing, or rhonchi  GI:  Abdomen is soft, obese, no rigidity or guarding    No distension, or mass    No tenderness or rebound tenderness   MS:  Normal muscular tone    Symmetric motor strength    No lower extremity edema. No calf swelling or tenderness.   Skin:  No rash or acute skin lesions noted  Neuro: Awake and alert  Speech is normal and fluent  Moves all extremities spontaneously  Psych:  Normal affect. Appropriate interactions.      Emergency Department Course   ECG  ECG taken at 1503, ECG read at 1742  Normal sinus rhythm with sinus arrhythmia  Minimal voltage criteria for LVH, may be normal variant  Inferior infarct, age undetermined  Abnormal ECG  Rate 64 bpm. GA interval 162 ms. QRS duration 92 ms. QT/QTc 428/441 ms. P-R-T axes 44 -24 34.     Imaging:  CXR results pending at the time the patient eloped from the ED.  XR Chest 2 Views    (Results Pending)       Laboratory:  CBC: WBC: 8.6, HGB: 14.6, PLT: 223  BMP: Glucose 100 (H), o/w WNL (Creatinine: 0.91)  Troponin  (Collected 1449): <0.015    Emergency Department Course:    Reviewed:  I reviewed nursing notes, vitals, past medical history and care everywhere    ED Course as of Sep 03 1947   Fri Sep 03, 2021   1743 I obtained history and examined the patient as noted above.          Disposition:   The patient eloped from the ED.      Impression & Plan     Medical Decision Making:  Nathan Martins is a 76 year old male who presents after 2 intermittent episodes of brief shortness of breath.  On my evaluation he is well-appearing, hemodynamically stable and afebrile.  No increased work of breathing, hypoxia or respiratory distress.  Patient reports he is completely asymptomatic upon arrival to the emergency department.  These episodes were not associated with dizziness, syncope or near syncope or chest pain.  Patient's exam is reassuring.  No evidence of volume overload or peripheral edema.  Lungs are clear without wheezing rales or rhonchi.  Work-up in the emergency department is initially reassuring.  CBC, BMP and troponin are negative.  These laboratory studies were discussed with the patient and his wife.  EKG reveals normal sinus rhythm without evidence of acute ischemia or dysrhythmia.  There are age-indeterminate inferior Q waves which are of unclear significance as it relates to his presentation today.  He denies any recent episodes of chest discomfort or chest pain.  I recommended chest x-ray for further evaluation of the patient's lungs and thorax.  Images were obtained and results were pending when the patient and the patient's wife eloped from the emergency department after informing nursing staff that they no longer wish to wait.  They had discussed following up with their outpatient cardiologist this week for further discussion of these episodes.  Patient eloped from the emergency department in stable condition.  Etiology of his shortness of breath episodes is unclear but at this time it does not appear to be an  acute coronary syndrome, myocardial infarction, pulmonary embolism, congestive heart failure exacerbation reactive airway disease or other process that requires immediate intervention or hospitalization. Patient left without discharge paperwork or return instructions.      Diagnosis:    ICD-10-CM    1. Shortness of breath  R06.02        Discharge Medications:  New Prescriptions    No medications on file       Scribe Disclosure:  I, Junior King, am serving as a scribe at 5:41 PM on 9/3/2021 to document services personally performed by Junior Paetl MD based on my observations and the provider's statements to me.      Junior Patel MD  09/04/21 1232

## 2021-09-03 NOTE — ED TRIAGE NOTES
Patient presents with wife with concerns of shortness of breath. Around 1300, while lifting wood, he started feeling very SOB. Wife states SOB lasted around 10 minutes. When they got back into the truck shortly after, his wife noticed his face was flushed. Wife made him take 324 mg ASA in truck. Hx of CAD with stent placement. Asymptomatic at this time.

## 2021-09-04 ASSESSMENT — ENCOUNTER SYMPTOMS
CHILLS: 0
FEVER: 0
FATIGUE: 0

## 2021-09-06 LAB
ATRIAL RATE - MUSE: 64 BPM
DIASTOLIC BLOOD PRESSURE - MUSE: NORMAL MMHG
INTERPRETATION ECG - MUSE: NORMAL
P AXIS - MUSE: 44 DEGREES
PR INTERVAL - MUSE: 166 MS
QRS DURATION - MUSE: 92 MS
QT - MUSE: 428 MS
QTC - MUSE: 441 MS
R AXIS - MUSE: -24 DEGREES
SYSTOLIC BLOOD PRESSURE - MUSE: NORMAL MMHG
T AXIS - MUSE: 34 DEGREES
VENTRICULAR RATE- MUSE: 64 BPM

## 2021-09-09 ENCOUNTER — OFFICE VISIT (OUTPATIENT)
Dept: CARDIOLOGY | Facility: CLINIC | Age: 76
End: 2021-09-09
Payer: COMMERCIAL

## 2021-09-09 VITALS
BODY MASS INDEX: 25.8 KG/M2 | HEART RATE: 75 BPM | SYSTOLIC BLOOD PRESSURE: 164 MMHG | DIASTOLIC BLOOD PRESSURE: 86 MMHG | OXYGEN SATURATION: 97 % | HEIGHT: 69 IN | WEIGHT: 174.2 LBS

## 2021-09-09 DIAGNOSIS — I25.10 CORONARY ARTERY DISEASE INVOLVING NATIVE CORONARY ARTERY OF NATIVE HEART WITHOUT ANGINA PECTORIS: ICD-10-CM

## 2021-09-09 DIAGNOSIS — I10 ESSENTIAL HYPERTENSION: ICD-10-CM

## 2021-09-09 PROCEDURE — 99214 OFFICE O/P EST MOD 30 MIN: CPT | Performed by: PHYSICIAN ASSISTANT

## 2021-09-09 RX ORDER — HYDROCHLOROTHIAZIDE 25 MG/1
25 TABLET ORAL DAILY
Qty: 30 TABLET | Refills: 3 | Status: SHIPPED | OUTPATIENT
Start: 2021-09-09 | End: 2021-09-15

## 2021-09-09 RX ORDER — LISINOPRIL 30 MG/1
30 TABLET ORAL DAILY
Qty: 90 TABLET | Refills: 3 | Status: SHIPPED | OUTPATIENT
Start: 2021-09-09 | End: 2022-01-18

## 2021-09-09 ASSESSMENT — MIFFLIN-ST. JEOR: SCORE: 1510.55

## 2021-09-09 NOTE — LETTER
2021    Kenneth G. Pallas, MD  [facility] [address]  [facility] [id number]    RE: Nathan Martins       Dear Colleague,    I had the pleasure of seeing Nathan Martins in the [facility].        CARDIOLOGY CLINIC PROGRESS NOTE    DOS: 2021      Nathan Martins  : 1945, 76 year old  MRN: [id number]      History:  Nathan Martins, a 76-year-old man with hypertension, dyslipidemia and coronary artery disease.       In , he had an abnormal stress test prior to left wrist surgery.  Diagnostic angiography was done showing distal occlusion of circumflex with good collaterals.  No significant narrowing in dominant right coronary or left main.  Significant narrowing in LAD treated with a bare-metal stent.      Repeat coronary angiography in  showing continued patency at LAD intervention site with no change in other coronary vessels.        Seen 9/3/21 in the ER for SOB.  BP was elevated 140-150s,  O2 sats upper 90s. EKG showed NSR.  Troponin WNL.  A CXR was completed but they left before getting results.   CXR:  IMPRESSION: Mild left basilar predominant interstitial coarsening which may reflect chronic changes and/or mild edema or pneumonitis. No pleural effusion. Normal heart size. No overt vascular congestion.      He tells me he has had some mild BRYANT with carrying heavy things over the years, but last Friday he was moving things to a dump and had some shortness of breath when carrying heavy wood and his wife thought he looked a little pale.  He does not think he holds his breath with lifting.   He has had no chest pain, no edema, no orthopnea, no near syncope. No palpitations.   He walks the dog 2 times a day about 1/2 mile each time and no BRYANT, no chest pain, though the walk is flat.   He takes stairs and feels ok.   He golfs twice weekly on hilly [place] and has no sxs.   Smoked for about 40 years  about 1-1.5 packs per day.  Quit in 1999.     ROS:  Skin:  Negative     Eyes:  Positive for glasses  ENT:  Negative    Respiratory:  Positive for dyspnea on exertion  Cardiovascular:  Negative    Gastroenterology: Negative    Genitourinary:  Negative    Musculoskeletal:  Negative    Neurologic:  Negative    Psychiatric:  Negative    Heme/Lymph/Imm:  Positive for easy bruising  Endocrine:  Negative      PAST MEDICAL HISTORY:  Past Medical History:   Diagnosis Date     Arthritis     generalized     Cardiomyopathy (H)     4/2012 EF 30-35% by Cath, 12/2012 EF 50% by cath, 11/2012 EF 42% by Echo     Coronary artery disease     04/12 Proximal RCA 20% ( collateral filling of OM), CFX occluded with collaterals (collateral filling of distal OM), Proximal LAD, before first septal + 1st diag., has 85% stenosis,  Balloon angioplasty and BMS to proximal LAD, 12/2012 Cath - minimal in stent restenosis     Dyslipidemia      Hypertension      Stented coronary artery 4/3/12    04/12 Proximal RCA 20% ( collateral filling of OM), CFX occluded with collaterals,  (collateral filling of distal OM),Proximal LAD, before first septal + 1st diag., has 85% stenosis,  Balloon angioplasty and BMS to proximal LAD       PAST SURGICAL HISTORY:  Past Surgical History:   Procedure Laterality Date     ANGIOPLASTY  4/3/12     04/12 Proximal RCA 20% ( collateral filling of OM), CFX occluded with collaterals (collateral filling of distal OM), Proximal LAD, before first septal + 1st diag., has 85% stenosis,  Balloon angioplasty and BMS to proximal LAD, 12/2012 Cath - minimal in stent restenosis     CATARACT IOL, RT/LT       ORTHOPEDIC SURGERY      right elbow     OSTEOTOMY WRIST  5/22/2012    Procedure:OSTEOTOMY WRIST; Left Distal Radius Corrective Osteotomy         SOCIAL HISTORY:  Social History     Socioeconomic History     Marital status:      Spouse name: Not on file     Number of children: Not on file     Years  of education: Not on file     Highest education level: Not on file   Occupational History     Not on file   Tobacco Use     Smoking status: Former Smoker     Types: Cigarettes     Quit date: 2001     Years since quittin.6     Smokeless tobacco: Never Used   Substance and Sexual Activity     Alcohol use: Yes     Alcohol/week: 0.0 standard drinks     Comment: occas     Drug use: No     Sexual activity: Not on file   Other Topics Concern     Parent/sibling w/ CABG, MI or angioplasty before 65F 55M? Not Asked      Service Not Asked     Blood Transfusions Not Asked     Caffeine Concern No     Comment: 1-2 cups per day     Occupational Exposure Not Asked     Hobby Hazards Not Asked     Sleep Concern No     Stress Concern No     Weight Concern No     Special Diet No     Back Care Not Asked     Exercise Yes     Comment: walking dog 2 times daily     Bike Helmet Not Asked     Seat Belt Yes     Self-Exams Not Asked   Social History Narrative     Not on file     Social Determinants of Health     Financial Resource Strain:      Difficulty of Paying Living Expenses:    Food Insecurity:      Worried About Running Out of Food in the Last Year:      Ran Out of Food in the Last Year:    Transportation Needs:      Lack of Transportation (Medical):      Lack of Transportation (Non-Medical):    Physical Activity:      Days of Exercise per Week:      Minutes of Exercise per Session:    Stress:      Feeling of Stress :    Social Connections:      Frequency of Communication with Friends and Family:      Frequency of Social Gatherings with Friends and Family:      Attends Gnosticism Services:      Active Member of Clubs or Organizations:      Attends Club or Organization Meetings:      Marital Status:    Intimate Partner Violence:      Fear of Current or Ex-Partner:      Emotionally Abused:      Physically Abused:      Sexually Abused:        FAMILY HISTORY:  Family History   Problem Relation Age of Onset     Other - See  "Comments Mother 96        old age     Cancer Father 47        pancreatic     Diabetes Sister        MEDS: aspirin 81 MG tablet, Take by mouth daily  atorvastatin (LIPITOR) 80 MG tablet, Take 1 tablet (80 mg) by mouth daily  carvedilol (COREG) 12.5 MG tablet, Take 1 tablet (12.5 mg) by mouth 2 times daily (with meals)  cholecalciferol (VITAMIN  -D) 1000 UNITS capsule, Take 1 capsule by mouth daily  Multiple Vitamins-Minerals (CENTRUM SILVER 50+MEN PO), Take by mouth daily  nitroGLYcerin (NITROSTAT) 0.4 MG sublingual tablet, Place 1 tablet (0.4 mg) under the tongue every 5 minutes as needed for chest pain (Patient not taking: Reported on 9/9/2021)    No current facility-administered medications on file prior to visit.      ALLERGIES: No Known Allergies    PHYSICAL EXAM:  Vitals: BP (!) 164/86 (BP Location: Right arm, Patient Position: Sitting, Cuff Size: Adult Regular)   Pulse 75   Ht 1.753 m (5' 9\")   Wt 79 kg (174 lb 3.2 oz)   SpO2 97%   BMI 25.72 kg/m    Constitutional:  cooperative, alert and oriented, well developed, well nourished, in no acute distress        Skin:  warm and dry to the touch, no apparent skin lesions or masses noted        Head:  normocephalic, no masses or lesions        Eyes:  pupils equal and round;conjunctivae and lids unremarkable;sclera white        ENT:  no pallor or cyanosis        Neck:  JVP normal        Respiratory:      clear without rales/wheezing    Cardiac: regular rhythm, normal S1/S2, no S3 or S4, apical impulse not displaced, no murmurs, gallops or rubs                  GI:  abdomen soft;BS normoactive        Vascular: pulses full and equal                                      Extremities and Musculoskeletal:  no deformities, clubbing, cyanosis, erythema observed;no edema;normal muscle strength and tone        Neurological:  no gross motor deficits;affect appropriate            LABS/DATA:  I reviewed the following:  Component      Latest Ref Rng & Units 9/3/2021   WBC      " 4.0 - 11.0 10e3/uL 8.6   RBC Count      4.40 - 5.90 10e6/uL 4.48   Hemoglobin      13.3 - 17.7 g/dL 14.6   Hematocrit      40.0 - 53.0 % 43.4   MCV      78 - 100 fL 97   MCH      26.5 - 33.0 pg 32.6   MCHC      31.5 - 36.5 g/dL 33.6   RDW      10.0 - 15.0 % 13.3   Platelet Count      150 - 450 10e3/uL 223   % Neutrophils      % 61   % Lymphocytes      % 25   % Monocytes      % 9   % Eosinophils      % 4   % Basophils      % 1   % Immature Granulocytes      % 0   NRBCs per 100 WBC      <1 /100 0   Absolute Neutrophils      1.6 - 8.3 10e3/uL 5.2   Absolute Lymphocytes      0.8 - 5.3 10e3/uL 2.1   Absolute Monocytes      0.0 - 1.3 10e3/uL 0.8   Absolute Eosinophils      0.0 - 0.7 10e3/uL 0.4   Absolute Basophils      0.0 - 0.2 10e3/uL 0.0   Absolute Immature Granulocytes      <=0.0 10e3/uL 0.0   Absolute NRBCs      10e3/uL 0.0   Sodium      133 - 144 mmol/L 139   Potassium      3.4 - 5.3 mmol/L 4.1   Chloride      94 - 109 mmol/L 109   Carbon Dioxide      20 - 32 mmol/L 27   Anion Gap      3 - 14 mmol/L 3   Urea Nitrogen      7 - 30 mg/dL 11   Creatinine      0.66 - 1.25 mg/dL 0.91   Calcium      8.5 - 10.1 mg/dL 8.5   Glucose      70 - 99 mg/dL 100 (H)   GFR Estimate      >60 mL/min/1.73m2 82   Troponin I      0.000 - 0.045 ug/L <0.015       9/3/21 CXR:  IMPRESSION: Mild left basilar predominant interstitial coarsening which may reflect chronic changes and/or mild edema or pneumonitis. No pleural effusion. Normal heart size. No overt vascular congestion.          ASSESSMENT/PLAN:  CAD  - no angina  - Does have some BRYANT.  After we get BP controlled, if persists, would consider a stress test.  For now, he is active without BRYANT or angina.  Only had BRYANT with lifting and would like to wait on stress testing  - Continue ASA, BB, statin      HTN  - BP uncontrolled in the ER and today as well at 164/86 on lisinopril 20 mg and Coreg 12.5 mg BID  - Increase lisinopril to 30 mg, add hydrochlorothiazide 25 mg  - Follow up BMP and  visit in 3 weeks      Dyslipidemia  - Continue atorvastatin 80 mg given his history of CAD      BRYANT  - With lifting heavy things, but not with walking dog, taking stairs or playing golf on a [place]  - Treating BP as noted above  - In follow up, consider echo, stress test, and/or PFTs        Follow up:  3 weeks with BMP    Abby Carvalho PA-C      Thank you for allowing me to participate in the care of your patient.      Sincerely,     Abby Carvalho PA-C     [facility]  cc:   No referring provider defined for this encounter.

## 2021-09-09 NOTE — PROGRESS NOTES
CARDIOLOGY CLINIC PROGRESS NOTE    DOS: 2021      Nathan Martins  : 1945, 76 year old  MRN: [id number]      History:  Nathan Martins, a 76-year-old man with hypertension, dyslipidemia and coronary artery disease.       In , he had an abnormal stress test prior to left wrist surgery.  Diagnostic angiography was done showing distal occlusion of circumflex with good collaterals.  No significant narrowing in dominant right coronary or left main.  Significant narrowing in LAD treated with a bare-metal stent.      Repeat coronary angiography in  showing continued patency at LAD intervention site with no change in other coronary vessels.        Seen 9/3/21 in the ER for SOB.  BP was elevated 140-150s,  O2 sats upper 90s. EKG showed NSR.  Troponin WNL.  A CXR was completed but they left before getting results.   CXR:  IMPRESSION: Mild left basilar predominant interstitial coarsening which may reflect chronic changes and/or mild edema or pneumonitis. No pleural effusion. Normal heart size. No overt vascular congestion.      He tells me he has had some mild BRYANT with carrying heavy things over the years, but last Friday he was moving things to a dump and had some shortness of breath when carrying heavy wood and his wife thought he looked a little pale.  He does not think he holds his breath with lifting.   He has had no chest pain, no edema, no orthopnea, no near syncope. No palpitations.   He walks the dog 2 times a day about 1/2 mile each time and no BRYANT, no chest pain, though the walk is flat.   He takes stairs and feels ok.   He golfs twice weekly on hilly [place] and has no sxs.   Smoked for about 40 years about 1-1.5 packs per day.  Quit in .     ROS:  Skin:  Negative     Eyes:  Positive for glasses  ENT:  Negative    Respiratory:  Positive for dyspnea on exertion  Cardiovascular:  Negative    Gastroenterology: Negative    Genitourinary:  Negative    Musculoskeletal:  Negative     Neurologic:  Negative    Psychiatric:  Negative    Heme/Lymph/Imm:  Positive for easy bruising  Endocrine:  Negative      PAST MEDICAL HISTORY:  Past Medical History:   Diagnosis Date     Arthritis     generalized     Cardiomyopathy (H)     2012 EF 30-35% by Cath, 2012 EF 50% by cath, 2012 EF 42% by Echo     Coronary artery disease      Proximal RCA 20% ( collateral filling of OM), CFX occluded with collaterals (collateral filling of distal OM), Proximal LAD, before first septal + 1st diag., has 85% stenosis,  Balloon angioplasty and BMS to proximal LAD, 2012 Cath - minimal in stent restenosis     Dyslipidemia      Hypertension      Stented coronary artery 4/3/12    04/12 Proximal RCA 20% ( collateral filling of OM), CFX occluded with collaterals,  (collateral filling of distal OM),Proximal LAD, before first septal + 1st diag., has 85% stenosis,  Balloon angioplasty and BMS to proximal LAD       PAST SURGICAL HISTORY:  Past Surgical History:   Procedure Laterality Date     ANGIOPLASTY  4/3/12     04/12 Proximal RCA 20% ( collateral filling of OM), CFX occluded with collaterals (collateral filling of distal OM), Proximal LAD, before first septal + 1st diag., has 85% stenosis,  Balloon angioplasty and BMS to proximal LAD, 2012 Cath - minimal in stent restenosis     CATARACT IOL, RT/LT       ORTHOPEDIC SURGERY      right elbow     OSTEOTOMY WRIST  2012    Procedure:OSTEOTOMY WRIST; Left Distal Radius Corrective Osteotomy         SOCIAL HISTORY:  Social History     Socioeconomic History     Marital status:      Spouse name: Not on file     Number of children: Not on file     Years of education: Not on file     Highest education level: Not on file   Occupational History     Not on file   Tobacco Use     Smoking status: Former Smoker     Types: Cigarettes     Quit date: 2001     Years since quittin.6     Smokeless tobacco: Never Used   Substance and  Sexual Activity     Alcohol use: Yes     Alcohol/week: 0.0 standard drinks     Comment: occas     Drug use: No     Sexual activity: Not on file   Other Topics Concern     Parent/sibling w/ CABG, MI or angioplasty before 65F 55M? Not Asked      Service Not Asked     Blood Transfusions Not Asked     Caffeine Concern No     Comment: 1-2 cups per day     Occupational Exposure Not Asked     Hobby Hazards Not Asked     Sleep Concern No     Stress Concern No     Weight Concern No     Special Diet No     Back Care Not Asked     Exercise Yes     Comment: walking dog 2 times daily     Bike Helmet Not Asked     Seat Belt Yes     Self-Exams Not Asked   Social History Narrative     Not on file     Social Determinants of Health     Financial Resource Strain:      Difficulty of Paying Living Expenses:    Food Insecurity:      Worried About Running Out of Food in the Last Year:      Ran Out of Food in the Last Year:    Transportation Needs:      Lack of Transportation (Medical):      Lack of Transportation (Non-Medical):    Physical Activity:      Days of Exercise per Week:      Minutes of Exercise per Session:    Stress:      Feeling of Stress :    Social Connections:      Frequency of Communication with Friends and Family:      Frequency of Social Gatherings with Friends and Family:      Attends Spiritism Services:      Active Member of Clubs or Organizations:      Attends Club or Organization Meetings:      Marital Status:    Intimate Partner Violence:      Fear of Current or Ex-Partner:      Emotionally Abused:      Physically Abused:      Sexually Abused:        FAMILY HISTORY:  Family History   Problem Relation Age of Onset     Other - See Comments Mother 96        old age     Cancer Father 47        pancreatic     Diabetes Sister        MEDS: aspirin 81 MG tablet, Take by mouth daily  atorvastatin (LIPITOR) 80 MG tablet, Take 1 tablet (80 mg) by mouth daily  carvedilol (COREG) 12.5 MG tablet, Take 1 tablet (12.5 mg)  "by mouth 2 times daily (with meals)  cholecalciferol (VITAMIN  -D) 1000 UNITS capsule, Take 1 capsule by mouth daily  Multiple Vitamins-Minerals (CENTRUM SILVER 50+MEN PO), Take by mouth daily  nitroGLYcerin (NITROSTAT) 0.4 MG sublingual tablet, Place 1 tablet (0.4 mg) under the tongue every 5 minutes as needed for chest pain (Patient not taking: Reported on 9/9/2021)    No current facility-administered medications on file prior to visit.      ALLERGIES: No Known Allergies    PHYSICAL EXAM:  Vitals: BP (!) 164/86 (BP Location: Right arm, Patient Position: Sitting, Cuff Size: Adult Regular)   Pulse 75   Ht 1.753 m (5' 9\")   Wt 79 kg (174 lb 3.2 oz)   SpO2 97%   BMI 25.72 kg/m    Constitutional:  cooperative, alert and oriented, well developed, well nourished, in no acute distress        Skin:  warm and dry to the touch, no apparent skin lesions or masses noted        Head:  normocephalic, no masses or lesions        Eyes:  pupils equal and round;conjunctivae and lids unremarkable;sclera white        ENT:  no pallor or cyanosis        Neck:  JVP normal        Respiratory:      clear without rales/wheezing    Cardiac: regular rhythm, normal S1/S2, no S3 or S4, apical impulse not displaced, no murmurs, gallops or rubs                  GI:  abdomen soft;BS normoactive        Vascular: pulses full and equal                                      Extremities and Musculoskeletal:  no deformities, clubbing, cyanosis, erythema observed;no edema;normal muscle strength and tone        Neurological:  no gross motor deficits;affect appropriate            LABS/DATA:  I reviewed the following:  Component      Latest Ref Rng & Units 9/3/2021   WBC      4.0 - 11.0 10e3/uL 8.6   RBC Count      4.40 - 5.90 10e6/uL 4.48   Hemoglobin      13.3 - 17.7 g/dL 14.6   Hematocrit      40.0 - 53.0 % 43.4   MCV      78 - 100 fL 97   MCH      26.5 - 33.0 pg 32.6   MCHC      31.5 - 36.5 g/dL 33.6   RDW      10.0 - 15.0 % 13.3   Platelet Count    "   150 - 450 10e3/uL 223   % Neutrophils      % 61   % Lymphocytes      % 25   % Monocytes      % 9   % Eosinophils      % 4   % Basophils      % 1   % Immature Granulocytes      % 0   NRBCs per 100 WBC      <1 /100 0   Absolute Neutrophils      1.6 - 8.3 10e3/uL 5.2   Absolute Lymphocytes      0.8 - 5.3 10e3/uL 2.1   Absolute Monocytes      0.0 - 1.3 10e3/uL 0.8   Absolute Eosinophils      0.0 - 0.7 10e3/uL 0.4   Absolute Basophils      0.0 - 0.2 10e3/uL 0.0   Absolute Immature Granulocytes      <=0.0 10e3/uL 0.0   Absolute NRBCs      10e3/uL 0.0   Sodium      133 - 144 mmol/L 139   Potassium      3.4 - 5.3 mmol/L 4.1   Chloride      94 - 109 mmol/L 109   Carbon Dioxide      20 - 32 mmol/L 27   Anion Gap      3 - 14 mmol/L 3   Urea Nitrogen      7 - 30 mg/dL 11   Creatinine      0.66 - 1.25 mg/dL 0.91   Calcium      8.5 - 10.1 mg/dL 8.5   Glucose      70 - 99 mg/dL 100 (H)   GFR Estimate      >60 mL/min/1.73m2 82   Troponin I      0.000 - 0.045 ug/L <0.015       9/3/21 CXR:  IMPRESSION: Mild left basilar predominant interstitial coarsening which may reflect chronic changes and/or mild edema or pneumonitis. No pleural effusion. Normal heart size. No overt vascular congestion.          ASSESSMENT/PLAN:  CAD  - no angina  - Does have some BRYANT.  After we get BP controlled, if persists, would consider a stress test.  For now, he is active without BRYANT or angina.  Only had BRYANT with lifting and would like to wait on stress testing  - Continue ASA, BB, statin      HTN  - BP uncontrolled in the ER and today as well at 164/86 on lisinopril 20 mg and Coreg 12.5 mg BID  - Increase lisinopril to 30 mg, add hydrochlorothiazide 25 mg  - Follow up BMP and visit in 3 weeks      Dyslipidemia  - Continue atorvastatin 80 mg given his history of CAD      BRYANT  - With lifting heavy things, but not with walking dog, taking stairs or playing golf [place] a [place]  - Treating BP as noted above  - In follow up, consider echo, stress test,  and/or PFTs        Follow up:  3 weeks with BMP    Abby Carvalho PA-C

## 2021-09-09 NOTE — PATIENT INSTRUCTIONS
Your blood pressure was elevated in the ER and here in the clinic today.   High blood pressure can cause some Shortness of breath.   We will increase lisinopril to 30 mg daily.   We will add a mild diuretic called hydrochlorothiazide at 25 mg once daily to help with blood pressure but to also get rid of some fluid.  See us back in about 3 weeks.  We will check some labs after making these medication changes and we will follow up on your blood pressure.   If you continue to have symptoms, we may get a stress test.     The chest xray noted some mild changes that could be chronic, but we do not have any prior images to compare.  Follow up with Dr. Pallas' office as they may want to repeat this.

## 2021-09-15 DIAGNOSIS — I10 ESSENTIAL HYPERTENSION: ICD-10-CM

## 2021-09-15 RX ORDER — HYDROCHLOROTHIAZIDE 25 MG/1
25 TABLET ORAL DAILY
Qty: 90 TABLET | Refills: 1 | Status: SHIPPED | OUTPATIENT
Start: 2021-09-15 | End: 2021-10-07

## 2021-09-15 NOTE — TELEPHONE ENCOUNTER
Medication Refilled: hctz  Last office visit: 2021 with Abby Carvalho PA-C   Last Labs/EK21 BMP  Next office visit: 10/7/2021 - 90 day script conversion per request.   Pharmacy sent to: Keegan Wilson RN

## 2021-10-07 ENCOUNTER — OFFICE VISIT (OUTPATIENT)
Dept: CARDIOLOGY | Facility: CLINIC | Age: 76
End: 2021-10-07
Attending: PHYSICIAN ASSISTANT
Payer: COMMERCIAL

## 2021-10-07 ENCOUNTER — LAB (OUTPATIENT)
Dept: LAB | Facility: CLINIC | Age: 76
End: 2021-10-07
Attending: PHYSICIAN ASSISTANT
Payer: COMMERCIAL

## 2021-10-07 VITALS
HEIGHT: 69 IN | DIASTOLIC BLOOD PRESSURE: 76 MMHG | HEART RATE: 72 BPM | SYSTOLIC BLOOD PRESSURE: 132 MMHG | BODY MASS INDEX: 25.92 KG/M2 | WEIGHT: 175 LBS

## 2021-10-07 DIAGNOSIS — I10 ESSENTIAL HYPERTENSION: ICD-10-CM

## 2021-10-07 LAB
ANION GAP SERPL CALCULATED.3IONS-SCNC: 7 MMOL/L (ref 3–14)
BUN SERPL-MCNC: 20 MG/DL (ref 7–30)
CALCIUM SERPL-MCNC: 8.9 MG/DL (ref 8.5–10.1)
CHLORIDE BLD-SCNC: 105 MMOL/L (ref 94–109)
CO2 SERPL-SCNC: 26 MMOL/L (ref 20–32)
CREAT SERPL-MCNC: 0.89 MG/DL (ref 0.66–1.25)
GFR SERPL CREATININE-BSD FRML MDRD: 83 ML/MIN/1.73M2
GLUCOSE BLD-MCNC: 122 MG/DL (ref 70–99)
POTASSIUM BLD-SCNC: 4.3 MMOL/L (ref 3.4–5.3)
SODIUM SERPL-SCNC: 138 MMOL/L (ref 133–144)

## 2021-10-07 PROCEDURE — 80048 BASIC METABOLIC PNL TOTAL CA: CPT | Performed by: PHYSICIAN ASSISTANT

## 2021-10-07 PROCEDURE — 36415 COLL VENOUS BLD VENIPUNCTURE: CPT | Performed by: PHYSICIAN ASSISTANT

## 2021-10-07 PROCEDURE — 99214 OFFICE O/P EST MOD 30 MIN: CPT | Performed by: PHYSICIAN ASSISTANT

## 2021-10-07 RX ORDER — ANTIARTHRITIC COMBINATION NO.2 900 MG
TABLET ORAL DAILY
COMMUNITY

## 2021-10-07 RX ORDER — HYDROCHLOROTHIAZIDE 25 MG/1
25 TABLET ORAL DAILY
Qty: 90 TABLET | Refills: 3 | Status: SHIPPED | OUTPATIENT
Start: 2021-10-07 | End: 2022-01-18

## 2021-10-07 ASSESSMENT — MIFFLIN-ST. JEOR: SCORE: 1514.17

## 2021-10-07 NOTE — PROGRESS NOTES
CARDIOLOGY CLINIC PROGRESS NOTE    DOS: 10/07/2021      Nathan Martins  : 1945, 76 year old  MRN: [id number]      History:  Nathan Martins, a 76-year-old man with hypertension, dyslipidemia and coronary artery disease.       In , he had an abnormal stress test prior to left wrist surgery.  Diagnostic angiography was done showing distal occlusion of circumflex with good collaterals.  No significant narrowing in dominant right coronary or left main.  Significant narrowing in LAD treated with a bare-metal stent.      Repeat coronary angiography in  showing continued patency at LAD intervention site with no change in other coronary vessels.     Seen 9/3/21 in the ER for SOB.  BP was elevated 140-150s,  O2 sats upper 90s. EKG showed NSR.  Troponin WNL.  A CXR was completed but they left before getting results.   CXR: IMPRESSION: Mild left basilar predominant interstitial coarsening which may reflect chronic changes and/or mild edema or pneumonitis. No pleural effusion. Normal heart size. No overt vascular congestion.    21: BP elevated, and he had some BRYANT with lifting. Increased lisinopril and added hydrochlorothiazide 25 mg.     He presents today for follow up.   He is taking and tolerating increased lisinopril and new hydrochlorothiazide.   BP is much improved.   BMP is WNL and stable.   He has had no chest pain, no edema, no orthopnea, no near syncope. No palpitations.   He walks the dog 2 times a day about 1/2 mile each time and no BRYANT, no chest pain, though the walk is flat.   He takes stairs and feels ok.   He golfs twice weekly on hilly [place] and has no sxs.   Smoked for about 40 years about 1-1.5 packs per day.  Quit in .   His PCP retired so they will be finding a new one soon.     ROS:  Skin:  Negative     Eyes:  Positive for glasses  ENT:  Negative    Respiratory:  Negative    Cardiovascular:  Negative    Gastroenterology: Negative    Genitourinary:  Negative     Musculoskeletal:  Negative    Neurologic:  Negative    Psychiatric:  Negative    Heme/Lymph/Imm:  Positive for easy bruising  Endocrine:  Negative      PAST MEDICAL HISTORY:  Past Medical History:   Diagnosis Date     Arthritis     generalized     Cardiomyopathy (H)     2012 EF 30-35% by Cath, 2012 EF 50% by cath, 2012 EF 42% by Echo     Coronary artery disease      Proximal RCA 20% ( collateral filling of OM), CFX occluded with collaterals (collateral filling of distal OM), Proximal LAD, before first septal + 1st diag., has 85% stenosis,  Balloon angioplasty and BMS to proximal LAD, 2012 Cath - minimal in stent restenosis     Dyslipidemia      Hypertension      Stented coronary artery 4/3/12    04/12 Proximal RCA 20% ( collateral filling of OM), CFX occluded with collaterals,  (collateral filling of distal OM),Proximal LAD, before first septal + 1st diag., has 85% stenosis,  Balloon angioplasty and BMS to proximal LAD       PAST SURGICAL HISTORY:  Past Surgical History:   Procedure Laterality Date     ANGIOPLASTY  4/3/12     04/12 Proximal RCA 20% ( collateral filling of OM), CFX occluded with collaterals (collateral filling of distal OM), Proximal LAD, before first septal + 1st diag., has 85% stenosis,  Balloon angioplasty and BMS to proximal LAD, 2012 Cath - minimal in stent restenosis     CATARACT IOL, RT/LT       ORTHOPEDIC SURGERY      right elbow     OSTEOTOMY WRIST  2012    Procedure:OSTEOTOMY WRIST; Left Distal Radius Corrective Osteotomy         SOCIAL HISTORY:  Social History     Socioeconomic History     Marital status:      Spouse name: Not on file     Number of children: Not on file     Years of education: Not on file     Highest education level: Not on file   Occupational History     Not on file   Tobacco Use     Smoking status: Former Smoker     Types: Cigarettes     Quit date: 2001     Years since quittin.6     Smokeless  tobacco: Never Used   Substance and Sexual Activity     Alcohol use: Yes     Alcohol/week: 0.0 standard drinks     Comment: occas     Drug use: No     Sexual activity: Not on file   Other Topics Concern     Parent/sibling w/ CABG, MI or angioplasty before 65F 55M? Not Asked      Service Not Asked     Blood Transfusions Not Asked     Caffeine Concern No     Comment: 1-2 cups per day     Occupational Exposure Not Asked     Hobby Hazards Not Asked     Sleep Concern No     Stress Concern No     Weight Concern No     Special Diet No     Back Care Not Asked     Exercise Yes     Comment: walking dog 2 times daily     Bike Helmet Not Asked     Seat Belt Yes     Self-Exams Not Asked   Social History Narrative     Not on file     Social Determinants of Health     Financial Resource Strain:      Difficulty of Paying Living Expenses:    Food Insecurity:      Worried About Running Out of Food in the Last Year:      Ran Out of Food in the Last Year:    Transportation Needs:      Lack of Transportation (Medical):      Lack of Transportation (Non-Medical):    Physical Activity:      Days of Exercise per Week:      Minutes of Exercise per Session:    Stress:      Feeling of Stress :    Social Connections:      Frequency of Communication with Friends and Family:      Frequency of Social Gatherings with Friends and Family:      Attends Zoroastrian Services:      Active Member of Clubs or Organizations:      Attends Club or Organization Meetings:      Marital Status:    Intimate Partner Violence:      Fear of Current or Ex-Partner:      Emotionally Abused:      Physically Abused:      Sexually Abused:        FAMILY HISTORY:  Family History   Problem Relation Age of Onset     Other - See Comments Mother 96        old age     Cancer Father 47        pancreatic     Diabetes Sister        MEDS: aspirin 81 MG tablet, Take by mouth daily  atorvastatin (LIPITOR) 80 MG tablet, Take 1 tablet (80 mg) by mouth daily  Biotin 5000 MCG TABS,  "Take by mouth daily  carvedilol (COREG) 12.5 MG tablet, Take 1 tablet (12.5 mg) by mouth 2 times daily (with meals)  cholecalciferol (VITAMIN  -D) 1000 UNITS capsule, Take 1 capsule by mouth daily  lisinopril (ZESTRIL) 30 MG tablet, Take 1 tablet (30 mg) by mouth daily  Multiple Vitamins-Minerals (CENTRUM SILVER 50+MEN PO), Take by mouth daily  nitroGLYcerin (NITROSTAT) 0.4 MG sublingual tablet, Place 1 tablet (0.4 mg) under the tongue every 5 minutes as needed for chest pain    No current facility-administered medications on file prior to visit.      ALLERGIES: No Known Allergies    PHYSICAL EXAM:  Vitals: /76   Pulse 72   Ht 1.753 m (5' 9\")   Wt 79.4 kg (175 lb)   BMI 25.84 kg/m    Constitutional:  cooperative, alert and oriented, well developed, well nourished, in no acute distress        Skin:  warm and dry to the touch, no apparent skin lesions or masses noted        Head:  normocephalic, no masses or lesions        Eyes:  pupils equal and round;conjunctivae and lids unremarkable;sclera white        ENT:  no pallor or cyanosis        Neck:  JVP normal        Respiratory:      clear without rales/wheezing    Cardiac: regular rhythm, normal S1/S2, no S3 or S4, apical impulse not displaced, no murmurs, gallops or rubs                  GI:  abdomen soft;BS normoactive        Vascular: pulses full and equal                                      Extremities and Musculoskeletal:  no deformities, clubbing, cyanosis, erythema observed;no edema;normal muscle strength and tone        Neurological:  no gross motor deficits;affect appropriate            LABS/DATA:  I reviewed the following:  Component      Latest Ref Rng & Units 10/7/2021   Sodium      133 - 144 mmol/L 138   Potassium      3.4 - 5.3 mmol/L 4.3   Chloride      94 - 109 mmol/L 105   Carbon Dioxide      20 - 32 mmol/L 26   Anion Gap      3 - 14 mmol/L 7   Urea Nitrogen      7 - 30 mg/dL 20   Creatinine      0.66 - 1.25 mg/dL 0.89   Calcium      8.5 - " 10.1 mg/dL 8.9   Glucose      70 - 99 mg/dL 122 (H)   GFR Estimate      >60 mL/min/1.73m2 83       ASSESSMENT/PLAN:  CAD  - no angina  - Did have some BRYANT but none since.   - Continue ASA, BB, statin      HTN  - BP uncontrolled in the ER and at clinic visit.  We increased lisinopril and added hydrochlorothiazide   - Currently on Coreg 12.5 mg BID, lisinopril 30 mg, hydrochlorothiazide 25 mg  - BP better controlled, and BMP ok      Dyslipidemia  - Continue atorvastatin 80 mg given his history of CAD      BRYANT  - With lifting heavy things, but not with walking dog, taking stairs or playing golf [place] a [place]  - Treating BP as noted above  - Improved with better BP control and low dose hydrochlorothiazide         Follow up:  Jan 2022 with Dr. Ridge Carvalho, [facility]

## 2021-10-07 NOTE — LETTER
10/7/2021    Kenneth G. Pallas, MD  [facility] [address]  [facility] [address]    RE: Nathan Martins       Dear Colleague,    I had the pleasure of seeing Nathan Martins in the [facility].        CARDIOLOGY CLINIC PROGRESS NOTE    DOS: 10/07/2021      Nathan Martins  : 1945, 76 year old  MRN: [id number]      History:  Nathan Martins, a 76-year-old man with hypertension, dyslipidemia and coronary artery disease.       In , he had an abnormal stress test prior to left wrist surgery.  Diagnostic angiography was done showing distal occlusion of circumflex with good collaterals.  No significant narrowing in dominant right coronary or left main.  Significant narrowing in LAD treated with a bare-metal stent.      Repeat coronary angiography in  showing continued patency at LAD intervention site with no change in other coronary vessels.     Seen 9/3/21 in the ER for SOB.  BP was elevated 140-150s,  O2 sats upper 90s. EKG showed NSR.  Troponin WNL.  A CXR was completed but they left before getting results.   CXR: IMPRESSION: Mild left basilar predominant interstitial coarsening which may reflect chronic changes and/or mild edema or pneumonitis. No pleural effusion. Normal heart size. No overt vascular congestion.    21: BP elevated, and he had some BRYANT with lifting. Increased lisinopril and added hydrochlorothiazide 25 mg.     He presents today for follow up.   He is taking and tolerating increased lisinopril and new hydrochlorothiazide.   BP is much improved.   BMP is WNL and stable.   He has had no chest pain, no edema, no orthopnea, no near syncope. No palpitations.   He walks the dog 2 times a day about 1/2 mile each time and no BRYANT, no chest pain, though the walk is flat.   He takes stairs and feels ok.   He golfs twice weekly on hilly [place] and has no sxs.   Smoked for about 40 years about 1-1.5  packs per day.  Quit in 1999.   His PCP retired so they will be finding a new one soon.     ROS:  Skin:  Negative     Eyes:  Positive for glasses  ENT:  Negative    Respiratory:  Negative    Cardiovascular:  Negative    Gastroenterology: Negative    Genitourinary:  Negative    Musculoskeletal:  Negative    Neurologic:  Negative    Psychiatric:  Negative    Heme/Lymph/Imm:  Positive for easy bruising  Endocrine:  Negative      PAST MEDICAL HISTORY:  Past Medical History:   Diagnosis Date     Arthritis     generalized     Cardiomyopathy (H)     4/2012 EF 30-35% by Cath, 12/2012 EF 50% by cath, 11/2012 EF 42% by Echo     Coronary artery disease     04/12 Proximal RCA 20% ( collateral filling of OM), CFX occluded with collaterals (collateral filling of distal OM), Proximal LAD, before first septal + 1st diag., has 85% stenosis,  Balloon angioplasty and BMS to proximal LAD, 12/2012 Cath - minimal in stent restenosis     Dyslipidemia      Hypertension      Stented coronary artery 4/3/12    04/12 Proximal RCA 20% ( collateral filling of OM), CFX occluded with collaterals,  (collateral filling of distal OM),Proximal LAD, before first septal + 1st diag., has 85% stenosis,  Balloon angioplasty and BMS to proximal LAD       PAST SURGICAL HISTORY:  Past Surgical History:   Procedure Laterality Date     ANGIOPLASTY  4/3/12     04/12 Proximal RCA 20% ( collateral filling of OM), CFX occluded with collaterals (collateral filling of distal OM), Proximal LAD, before first septal + 1st diag., has 85% stenosis,  Balloon angioplasty and BMS to proximal LAD, 12/2012 Cath - minimal in stent restenosis     CATARACT IOL, RT/LT       ORTHOPEDIC SURGERY      right elbow     OSTEOTOMY WRIST  5/22/2012    Procedure:OSTEOTOMY WRIST; Left Distal Radius Corrective Osteotomy         SOCIAL HISTORY:  Social History     Socioeconomic History     Marital status:      Spouse name: Not on file     Number of  children: Not on file     Years of education: Not on file     Highest education level: Not on file   Occupational History     Not on file   Tobacco Use     Smoking status: Former Smoker     Types: Cigarettes     Quit date: 2001     Years since quittin.6     Smokeless tobacco: Never Used   Substance and Sexual Activity     Alcohol use: Yes     Alcohol/week: 0.0 standard drinks     Comment: occas     Drug use: No     Sexual activity: Not on file   Other Topics Concern     Parent/sibling w/ CABG, MI or angioplasty before 65F 55M? Not Asked      Service Not Asked     Blood Transfusions Not Asked     Caffeine Concern No     Comment: 1-2 cups per day     Occupational Exposure Not Asked     Hobby Hazards Not Asked     Sleep Concern No     Stress Concern No     Weight Concern No     Special Diet No     Back Care Not Asked     Exercise Yes     Comment: walking dog 2 times daily     Bike Helmet Not Asked     Seat Belt Yes     Self-Exams Not Asked   Social History Narrative     Not on file     Social Determinants of Health     Financial Resource Strain:      Difficulty of Paying Living Expenses:    Food Insecurity:      Worried About Running Out of Food in the Last Year:      Ran Out of Food in the Last Year:    Transportation Needs:      Lack of Transportation (Medical):      Lack of Transportation (Non-Medical):    Physical Activity:      Days of Exercise per Week:      Minutes of Exercise per Session:    Stress:      Feeling of Stress :    Social Connections:      Frequency of Communication with Friends and Family:      Frequency of Social Gatherings with Friends and Family:      Attends Scientologist Services:      Active Member of Clubs or Organizations:      Attends Club or Organization Meetings:      Marital Status:    Intimate Partner Violence:      Fear of Current or Ex-Partner:      Emotionally Abused:      Physically Abused:      Sexually Abused:        FAMILY HISTORY:  Family History   Problem Relation  "Age of Onset     Other - See Comments Mother 96        old age     Cancer Father 47        pancreatic     Diabetes Sister        MEDS: aspirin 81 MG tablet, Take by mouth daily  atorvastatin (LIPITOR) 80 MG tablet, Take 1 tablet (80 mg) by mouth daily  Biotin 5000 MCG TABS, Take by mouth daily  carvedilol (COREG) 12.5 MG tablet, Take 1 tablet (12.5 mg) by mouth 2 times daily (with meals)  cholecalciferol (VITAMIN  -D) 1000 UNITS capsule, Take 1 capsule by mouth daily  lisinopril (ZESTRIL) 30 MG tablet, Take 1 tablet (30 mg) by mouth daily  Multiple Vitamins-Minerals (CENTRUM SILVER 50+MEN PO), Take by mouth daily  nitroGLYcerin (NITROSTAT) 0.4 MG sublingual tablet, Place 1 tablet (0.4 mg) under the tongue every 5 minutes as needed for chest pain    No current facility-administered medications on file prior to visit.      ALLERGIES: No Known Allergies    PHYSICAL EXAM:  Vitals: /76   Pulse 72   Ht 1.753 m (5' 9\")   Wt 79.4 kg (175 lb)   BMI 25.84 kg/m    Constitutional:  cooperative, alert and oriented, well developed, well nourished, in no acute distress        Skin:  warm and dry to the touch, no apparent skin lesions or masses noted        Head:  normocephalic, no masses or lesions        Eyes:  pupils equal and round;conjunctivae and lids unremarkable;sclera white        ENT:  no pallor or cyanosis        Neck:  JVP normal        Respiratory:      clear without rales/wheezing    Cardiac: regular rhythm, normal S1/S2, no S3 or S4, apical impulse not displaced, no murmurs, gallops or rubs                  GI:  abdomen soft;BS normoactive        Vascular: pulses full and equal                                      Extremities and Musculoskeletal:  no deformities, clubbing, cyanosis, erythema observed;no edema;normal muscle strength and tone        Neurological:  no gross motor deficits;affect appropriate            LABS/DATA:  I reviewed the following:  Component      Latest Ref Rng & Units 10/7/2021 "   Sodium      133 - 144 mmol/L 138   Potassium      3.4 - 5.3 mmol/L 4.3   Chloride      94 - 109 mmol/L 105   Carbon Dioxide      20 - 32 mmol/L 26   Anion Gap      3 - 14 mmol/L 7   Urea Nitrogen      7 - 30 mg/dL 20   Creatinine      0.66 - 1.25 mg/dL 0.89   Calcium      8.5 - 10.1 mg/dL 8.9   Glucose      70 - 99 mg/dL 122 (H)   GFR Estimate      >60 mL/min/1.73m2 83       ASSESSMENT/PLAN:  CAD  - no angina  - Did have some BRYANT but none since.   - Continue ASA, BB, statin      HTN  - BP uncontrolled in the ER and at clinic visit.  We increased lisinopril and added hydrochlorothiazide   - Currently on Coreg 12.5 mg BID, lisinopril 30 mg, hydrochlorothiazide 25 mg  - BP better controlled, and BMP ok      Dyslipidemia  - Continue atorvastatin 80 mg given his history of CAD      BRYANT  - With lifting heavy things, but not with walking dog, taking stairs or playing golf [place] a [place]  - Treating BP as noted above  - Improved with better BP control and low dose hydrochlorothiazide         Follow up:  Jan 2022 with Dr. Ridge Carvalho PA-C      Thank you for allowing me to participate in the care of your patient.      Sincerely,     Abby Carvalho PA-C     [facility]  cc:   Abby Carvalho PA-C  [address] [place] [address]

## 2022-01-18 ENCOUNTER — OFFICE VISIT (OUTPATIENT)
Dept: CARDIOLOGY | Facility: CLINIC | Age: 77
End: 2022-01-18
Payer: COMMERCIAL

## 2022-01-18 VITALS
DIASTOLIC BLOOD PRESSURE: 70 MMHG | SYSTOLIC BLOOD PRESSURE: 122 MMHG | HEART RATE: 85 BPM | HEIGHT: 69 IN | OXYGEN SATURATION: 93 % | WEIGHT: 174.3 LBS | BODY MASS INDEX: 25.82 KG/M2

## 2022-01-18 DIAGNOSIS — I25.10 CORONARY ARTERY DISEASE INVOLVING NATIVE CORONARY ARTERY OF NATIVE HEART WITHOUT ANGINA PECTORIS: Primary | ICD-10-CM

## 2022-01-18 DIAGNOSIS — I10 ESSENTIAL HYPERTENSION: ICD-10-CM

## 2022-01-18 PROCEDURE — 99214 OFFICE O/P EST MOD 30 MIN: CPT | Performed by: INTERNAL MEDICINE

## 2022-01-18 RX ORDER — ATORVASTATIN CALCIUM 80 MG/1
80 TABLET, FILM COATED ORAL DAILY
Qty: 30 TABLET | Refills: 11 | Status: SHIPPED | OUTPATIENT
Start: 2022-01-18 | End: 2022-12-09

## 2022-01-18 RX ORDER — CARVEDILOL 12.5 MG/1
12.5 TABLET ORAL 2 TIMES DAILY WITH MEALS
Qty: 180 TABLET | Refills: 3 | Status: SHIPPED | OUTPATIENT
Start: 2022-01-18 | End: 2022-12-20

## 2022-01-18 RX ORDER — HYDROCHLOROTHIAZIDE 25 MG/1
25 TABLET ORAL DAILY
Qty: 90 TABLET | Refills: 3 | Status: SHIPPED | OUTPATIENT
Start: 2022-01-18 | End: 2022-12-20

## 2022-01-18 RX ORDER — LISINOPRIL 30 MG/1
30 TABLET ORAL DAILY
Qty: 90 TABLET | Refills: 3 | Status: SHIPPED | OUTPATIENT
Start: 2022-01-18 | End: 2022-12-20

## 2022-01-18 ASSESSMENT — MIFFLIN-ST. JEOR: SCORE: 1511

## 2022-01-18 NOTE — LETTER
"1/18/2022    RE: Nathan Martins       Dear Colleague,     I had the pleasure of seeing Nathan Martins in the [facility].  HISTORY OF PRESENT ILLNESS:  Walking dog Tolerating meds. Has lost his doctor due to penitentiary.     Orders this Visit:  No orders of the defined types were placed in this encounter.    No orders of the defined types were placed in this encounter.    There are no discontinued medications.    No diagnosis found.    CURRENT MEDICATIONS:  Current Outpatient Medications   Medication Sig Dispense Refill     aspirin 81 MG tablet Take by mouth daily       atorvastatin (LIPITOR) 80 MG tablet Take 1 tablet (80 mg) by mouth daily 30 tablet 11     Biotin 5000 MCG TABS Take by mouth daily       carvedilol (COREG) 12.5 MG tablet Take 1 tablet (12.5 mg) by mouth 2 times daily (with meals) 180 tablet 3     cholecalciferol (VITAMIN  -D) 1000 UNITS capsule Take 1 capsule by mouth daily       hydrochlorothiazide (HYDRODIURIL) 25 MG tablet Take 1 tablet (25 mg) by mouth daily 90 tablet 3     lisinopril (ZESTRIL) 30 MG tablet Take 1 tablet (30 mg) by mouth daily 90 tablet 3     Multiple Vitamins-Minerals (CENTRUM SILVER 50+MEN PO) Take by mouth daily       nitroGLYcerin (NITROSTAT) 0.4 MG sublingual tablet Place 1 tablet (0.4 mg) under the tongue every 5 minutes as needed for chest pain 25 tablet 11       ALLERGIES   No Known Allergies    PAST MEDICAL, SURGICAL, FAMILY, SOCIAL HISTORY:  History was reviewed and updated as needed, see medical record.    Review of Systems:  A 12-point review of systems was completed, see medical record for detailed review of systems information.    Physical Exam:  Vitals: /70 (BP Location: Right arm, Patient Position: Sitting, Cuff Size: Adult Regular)   Pulse 85   Ht 1.753 m (5' 9\")   Wt 79.1 kg (174 lb 4.8 oz)   SpO2 93%   BMI 25.74 kg/m      Constitutional:           Skin:           Head:           Eyes:           ENT:           Neck:       "     Chest:           Cardiac:                    Abdomen:           Vascular:                                        Extremities and Back:           Neurological:           ASSESSMENT: Doing very well bp optimal     RECOMMENDATIONS:   Follow-up  In one year with lipid  profile      Recent Lab Results:  LIPID RESULTS:  Lab Results   Component Value Date    CHOL 133 01/21/2019    HDL 55 01/21/2019    LDL 68.8 01/21/2019    TRIG 46 01/21/2019    CHOLHDLRATIO 2.2 10/29/2015       LIVER ENZYME RESULTS:  Lab Results   Component Value Date    AST 22.0 01/21/2019    ALT 34.0 01/21/2019       CBC RESULTS:  Lab Results   Component Value Date    WBC 8.6 09/03/2021    WBC 9.0 01/21/2019    RBC 4.48 09/03/2021    RBC 4.59 01/21/2019    HGB 14.6 09/03/2021    HGB 14.6 01/21/2019    HCT 43.4 09/03/2021    HCT 44.0 01/21/2019    MCV 97 09/03/2021    MCV 95.9 01/21/2019    MCH 32.6 09/03/2021    MCH 31.8 01/21/2019    MCHC 33.6 09/03/2021    MCHC 33.2 01/21/2019    RDW 13.3 09/03/2021    RDW 13.6 01/21/2019     09/03/2021     01/21/2019     12/05/2012       BMP RESULTS:  Lab Results   Component Value Date     10/07/2021     01/21/2019    POTASSIUM 4.3 10/07/2021    POTASSIUM 4.4 01/21/2019    CHLORIDE 105 10/07/2021    CHLORIDE 107 01/21/2019    CO2 26 10/07/2021    CO2 25 12/05/2012    ANIONGAP 7 10/07/2021    ANIONGAP 10 12/05/2012     (H) 10/07/2021     (A) 01/21/2019    BUN 20 10/07/2021    BUN 16 01/21/2019    CR 0.89 10/07/2021    CR 0.84 01/21/2019    GFRESTIMATED 83 10/07/2021    GFRESTIMATED >90 12/05/2012    GFRESTBLACK >90 12/05/2012    TORY 8.9 10/07/2021    TORY 8.7 01/21/2019        A1C RESULTS:  No results found for: A1C    INR RESULTS:  Lab Results   Component Value Date    INR 1.03 12/05/2012       We greatly appreciate the opportunity to be involved in the care of your patient, Nathan Martins.        Raman Sabillon MD

## 2022-01-18 NOTE — PROGRESS NOTES
"HISTORY OF PRESENT ILLNESS:  Walking dog Tolerating meds. Has lost his doctor due to longterm.     Orders this Visit:  No orders of the defined types were placed in this encounter.    No orders of the defined types were placed in this encounter.    There are no discontinued medications.    No diagnosis found.    CURRENT MEDICATIONS:  Current Outpatient Medications   Medication Sig Dispense Refill     aspirin 81 MG tablet Take by mouth daily       atorvastatin (LIPITOR) 80 MG tablet Take 1 tablet (80 mg) by mouth daily 30 tablet 11     Biotin 5000 MCG TABS Take by mouth daily       carvedilol (COREG) 12.5 MG tablet Take 1 tablet (12.5 mg) by mouth 2 times daily (with meals) 180 tablet 3     cholecalciferol (VITAMIN  -D) 1000 UNITS capsule Take 1 capsule by mouth daily       hydrochlorothiazide (HYDRODIURIL) 25 MG tablet Take 1 tablet (25 mg) by mouth daily 90 tablet 3     lisinopril (ZESTRIL) 30 MG tablet Take 1 tablet (30 mg) by mouth daily 90 tablet 3     Multiple Vitamins-Minerals (CENTRUM SILVER 50+MEN PO) Take by mouth daily       nitroGLYcerin (NITROSTAT) 0.4 MG sublingual tablet Place 1 tablet (0.4 mg) under the tongue every 5 minutes as needed for chest pain 25 tablet 11       ALLERGIES   No Known Allergies    PAST MEDICAL, SURGICAL, FAMILY, SOCIAL HISTORY:  History was reviewed and updated as needed, see medical record.    Review of Systems:  A 12-point review of systems was completed, see medical record for detailed review of systems information.    Physical Exam:  Vitals: /70 (BP Location: Right arm, Patient Position: Sitting, Cuff Size: Adult Regular)   Pulse 85   Ht 1.753 m (5' 9\")   Wt 79.1 kg (174 lb 4.8 oz)   SpO2 93%   BMI 25.74 kg/m      Constitutional:           Skin:           Head:           Eyes:           ENT:           Neck:           Chest:           Cardiac:                    Abdomen:           Vascular:                                        Extremities and Back:       "     Neurological:           ASSESSMENT: Doing very well bp optimal     RECOMMENDATIONS:   Follow-up  In one year with lipid  profile      Recent Lab Results:  LIPID RESULTS:  Lab Results   Component Value Date    CHOL 133 01/21/2019    HDL 55 01/21/2019    LDL 68.8 01/21/2019    TRIG 46 01/21/2019    CHOLHDLRATIO 2.2 10/29/2015       LIVER ENZYME RESULTS:  Lab Results   Component Value Date    AST 22.0 01/21/2019    ALT 34.0 01/21/2019       CBC RESULTS:  Lab Results   Component Value Date    WBC 8.6 09/03/2021    WBC 9.0 01/21/2019    RBC 4.48 09/03/2021    RBC 4.59 01/21/2019    HGB 14.6 09/03/2021    HGB 14.6 01/21/2019    HCT 43.4 09/03/2021    HCT 44.0 01/21/2019    MCV 97 09/03/2021    MCV 95.9 01/21/2019    MCH 32.6 09/03/2021    MCH 31.8 01/21/2019    MCHC 33.6 09/03/2021    MCHC 33.2 01/21/2019    RDW 13.3 09/03/2021    RDW 13.6 01/21/2019     09/03/2021     01/21/2019     12/05/2012       BMP RESULTS:  Lab Results   Component Value Date     10/07/2021     01/21/2019    POTASSIUM 4.3 10/07/2021    POTASSIUM 4.4 01/21/2019    CHLORIDE 105 10/07/2021    CHLORIDE 107 01/21/2019    CO2 26 10/07/2021    CO2 25 12/05/2012    ANIONGAP 7 10/07/2021    ANIONGAP 10 12/05/2012     (H) 10/07/2021     (A) 01/21/2019    BUN 20 10/07/2021    BUN 16 01/21/2019    CR 0.89 10/07/2021    CR 0.84 01/21/2019    GFRESTIMATED 83 10/07/2021    GFRESTIMATED >90 12/05/2012    GFRESTBLACK >90 12/05/2012    TORY 8.9 10/07/2021    TORY 8.7 01/21/2019        A1C RESULTS:  No results found for: A1C    INR RESULTS:  Lab Results   Component Value Date    INR 1.03 12/05/2012       We greatly appreciate the opportunity to be involved in the care of your patient, Nathan Martins.    Sincerely,  Raman Sabillon MD      CC  No referring provider defined for this encounter.

## 2022-01-18 NOTE — PROGRESS NOTES
Service Date: 01/18/2022    HISTORY OF PRESENT ILLNESS:  Nathan Martins, a 76-year-old man with hypertension, dyslipidemia and coronary artery disease, was seen today at his request for followup.    Since I last saw the patient in 01/2021, his primary care physician, Dr. Pallas, has retired.  I noted the patient's blood pressure was suboptimally controlled during our last visit, and subsequent to that 1/2021 visit  has seen our advanced level practitioner on two occasions for upward titration of blood pressure medications with an excellent response to therapy The patient continues to walk on a regular basis and denies angina, syncope, palpitation or dyspnea.  He has not yet obtained a primary care doctor, but plans to do so in the near future.    PAST MEDICAL HISTORY:    1.  Hypertension.  2.  Dyslipidemia.  3.  Coronary artery disease.  a.  Abnormal stress test prior to left wrist surgery.  b.  Diagnostic angiography showing distal occlusion of circumflex with good collaterals.  No significant narrowing in nondominant right coronary or left main.  Significant narrowing in LAD treated with bare-metal stent.  c.  Repeat coronary angiography in 2015 showing continued patency at the LAD intervention site with no change in other coronary vessels.  Currently, free of angina.    PHYSICAL EXAMINATION:    GENERAL:  Exam today demonstrates a very pleasant, cooperative, and intelligent 76-year-old man who appears younger than stated age.  VITAL SIGNS:  His blood pressure is 122/70, his heart rate is 85.  His height is 1.75 meters, his weight is 79 kg.  His BMI is 25.  RESPIRATORY:  His lungs are clear to percussion and auscultation.  CARDIOVASCULAR:  Shows a normal S1 with a normal S2.  There is no S3.  There is no murmur, rub or click.  His pulses are full and symmetrical.    MEDICATIONS:    1.  Aspirin 81 mg daily.  2.  Atorvastatin 80 mg daily.  3.  Carvedilol 12.5 mg b.i.d.  4.  Hydrochlorothiazide 25 mg daily.  5.   Lisinopril 30 mg daily.    LABORATORY STUDIES:  His most recent laboratory studies in October showed a potassium of 4.3, creatinine 0.89.  His most recent cholesterol level was in 2019 and showed an LDL of 69.    ASSESSMENT:  Mr. Martins remains asymptomatic on guideline-directed medical therapy for coronary artery disease and hypertension with optimal blood pressure and LDL cholesterol levels at the time of our visit today. I am pleased his blood pressure is now well controlled.  As I have explained to the patient in the past, I do not provide primary care and he will need a primary care doctor to follow long-term for non-cardiac issues..    RECOMMENDATIONS:    1.  Continue present medical therapy.  2.  Mediterranean-style diet.  3.  Regular exercise program.  4.  Followup visit with me in 1 year with lipid profile at that time.  5.  He will find a new primary care MD in the near future.    We greatly appreciate the opportunity to care for your patient, Mr. Martins.        Raman Sabillon MD        D: 2022   T: 2022   MT: [id number]    Name:     NANDO MARTINS  MRN:      [id number]        Account:      [id number]   :      1945           Service Date: 2022       Document: [id number]

## 2022-01-18 NOTE — LETTER
Date:January 31, 2022      Patient was self referred, no letter generated. Do not send.        [facility] Health Information

## 2022-05-06 DIAGNOSIS — I25.10 CORONARY ARTERY DISEASE INVOLVING NATIVE CORONARY ARTERY OF NATIVE HEART WITHOUT ANGINA PECTORIS: ICD-10-CM

## 2022-05-06 RX ORDER — NITROGLYCERIN 0.4 MG/1
0.4 TABLET SUBLINGUAL EVERY 5 MIN PRN
Qty: 25 TABLET | Refills: 3 | Status: SHIPPED | OUTPATIENT
Start: 2022-05-06 | End: 2022-12-20

## 2022-12-08 ENCOUNTER — LAB (OUTPATIENT)
Dept: LAB | Facility: CLINIC | Age: 77
End: 2022-12-08
Payer: COMMERCIAL

## 2022-12-08 DIAGNOSIS — I25.10 CORONARY ARTERY DISEASE INVOLVING NATIVE CORONARY ARTERY OF NATIVE HEART WITHOUT ANGINA PECTORIS: ICD-10-CM

## 2022-12-08 DIAGNOSIS — I10 ESSENTIAL HYPERTENSION: ICD-10-CM

## 2022-12-08 LAB
ALT SERPL W P-5'-P-CCNC: 23 U/L (ref 10–50)
CHOLEST SERPL-MCNC: 120 MG/DL
HDLC SERPL-MCNC: 53 MG/DL
LDLC SERPL CALC-MCNC: 57 MG/DL
NONHDLC SERPL-MCNC: 67 MG/DL
TRIGL SERPL-MCNC: 48 MG/DL

## 2022-12-08 PROCEDURE — 84460 ALANINE AMINO (ALT) (SGPT): CPT

## 2022-12-08 PROCEDURE — 36415 COLL VENOUS BLD VENIPUNCTURE: CPT

## 2022-12-08 PROCEDURE — 80061 LIPID PANEL: CPT

## 2022-12-09 ENCOUNTER — OFFICE VISIT (OUTPATIENT)
Dept: CARDIOLOGY | Facility: CLINIC | Age: 77
End: 2022-12-09
Payer: COMMERCIAL

## 2022-12-09 VITALS
SYSTOLIC BLOOD PRESSURE: 122 MMHG | DIASTOLIC BLOOD PRESSURE: 78 MMHG | OXYGEN SATURATION: 95 % | HEART RATE: 82 BPM | WEIGHT: 177.2 LBS | BODY MASS INDEX: 26.17 KG/M2

## 2022-12-09 DIAGNOSIS — I10 ESSENTIAL HYPERTENSION: ICD-10-CM

## 2022-12-09 DIAGNOSIS — I25.10 CORONARY ARTERY DISEASE INVOLVING NATIVE CORONARY ARTERY OF NATIVE HEART WITHOUT ANGINA PECTORIS: Primary | ICD-10-CM

## 2022-12-09 PROCEDURE — 99214 OFFICE O/P EST MOD 30 MIN: CPT | Performed by: INTERNAL MEDICINE

## 2022-12-09 RX ORDER — ATORVASTATIN CALCIUM 80 MG/1
80 TABLET, FILM COATED ORAL DAILY
Qty: 90 TABLET | Refills: 11 | Status: SHIPPED | OUTPATIENT
Start: 2022-12-09 | End: 2024-03-04

## 2022-12-09 NOTE — LETTER
"12/9/2022    Kenneth G. Pallas, MD  [facility] [address]  [facility] [address]    RE: Nathan Martins       Dear Colleague,     I had the pleasure of seeing Nathan Martins in the [facility].  HISTORY OF PRESENT ILLNESS:  stable    Orders this Visit:  No orders of the defined types were placed in this encounter.    No orders of the defined types were placed in this encounter.    There are no discontinued medications.    No diagnosis found.    CURRENT MEDICATIONS:  Current Outpatient Medications   Medication Sig Dispense Refill     aspirin 81 MG tablet Take by mouth daily       atorvastatin (LIPITOR) 80 MG tablet Take 1 tablet (80 mg) by mouth daily 30 tablet 11     Biotin 5000 MCG TABS Take by mouth daily       carvedilol (COREG) 12.5 MG tablet Take 1 tablet (12.5 mg) by mouth 2 times daily (with meals) 180 tablet 3     cholecalciferol (VITAMIN  -D) 1000 UNITS capsule Take 1 capsule by mouth daily       hydrochlorothiazide (HYDRODIURIL) 25 MG tablet Take 1 tablet (25 mg) by mouth daily 90 tablet 3     lisinopril (ZESTRIL) 30 MG tablet Take 1 tablet (30 mg) by mouth daily 90 tablet 3     Multiple Vitamins-Minerals (CENTRUM SILVER 50+MEN PO) Take by mouth daily       nitroGLYcerin (NITROSTAT) 0.4 MG sublingual tablet Place 1 tablet (0.4 mg) under the tongue every 5 minutes as needed for chest pain 25 tablet 3       ALLERGIES   No Known Allergies    PAST MEDICAL, SURGICAL, FAMILY, SOCIAL HISTORY:  History was reviewed and updated as needed, see medical record.    Review of Systems:  A 12-point review of systems was completed, see medical record for detailed review of systems information.    Physical Exam:  Vitals: /78 (BP Location: Right arm, Patient Position: Sitting, Cuff Size: Adult Regular)   Pulse 82   Ht (P) 1.753 m (5' 9\")   Wt 80.4 kg (177 lb 3.2 oz)   SpO2 95%   BMI (P) 26.17 kg/m      Constitutional:           Skin:           Head:       "     Eyes:           ENT:           Neck:           Chest:           Cardiac:                    Abdomen:           Vascular:                                        Extremities and Back:           Neurological:           ASSESSMENT: stable       RECOMMENDATIONS: follow-up in one year      Recent Lab Results:  LIPID RESULTS:  Lab Results   Component Value Date    CHOL 120 12/08/2022    CHOL 133 01/21/2019    HDL 53 12/08/2022    HDL 55 01/21/2019    LDL 57 12/08/2022    LDL 68.8 01/21/2019    TRIG 48 12/08/2022    TRIG 46 01/21/2019    CHOLHDLRATIO 2.2 10/29/2015       LIVER ENZYME RESULTS:  Lab Results   Component Value Date    AST 22.0 01/21/2019    ALT 23 12/08/2022    ALT 34.0 01/21/2019       CBC RESULTS:  Lab Results   Component Value Date    WBC 8.6 09/03/2021    WBC 9.0 01/21/2019    RBC 4.48 09/03/2021    RBC 4.59 01/21/2019    HGB 14.6 09/03/2021    HGB 14.6 01/21/2019    HCT 43.4 09/03/2021    HCT 44.0 01/21/2019    MCV 97 09/03/2021    MCV 95.9 01/21/2019    MCH 32.6 09/03/2021    MCH 31.8 01/21/2019    MCHC 33.6 09/03/2021    MCHC 33.2 01/21/2019    RDW 13.3 09/03/2021    RDW 13.6 01/21/2019     09/03/2021     01/21/2019     12/05/2012       BMP RESULTS:  Lab Results   Component Value Date     10/07/2021     01/21/2019    POTASSIUM 4.3 10/07/2021    POTASSIUM 4.4 01/21/2019    CHLORIDE 105 10/07/2021    CHLORIDE 107 01/21/2019    CO2 26 10/07/2021    CO2 25 12/05/2012    ANIONGAP 7 10/07/2021    ANIONGAP 10 12/05/2012     (H) 10/07/2021     (A) 01/21/2019    BUN 20 10/07/2021    BUN 16 01/21/2019    CR 0.89 10/07/2021    CR 0.84 01/21/2019    GFRESTIMATED 83 10/07/2021    GFRESTIMATED >90 12/05/2012    GFRESTBLACK >90 12/05/2012    TORY 8.9 10/07/2021    TORY 8.7 01/21/2019        A1C RESULTS:  No results found for: A1C    INR RESULTS:  Lab Results   Component Value Date    INR 1.03 12/05/2012       We greatly appreciate the opportunity to be involved in the  care of your patient, Nathan Martins.    Sincerely,  Raman Sabillon MD      CC  No referring provider defined for this encounter.                                                                       Service Date: 12/09/2022    HISTORY OF PRESENT ILLNESS:  Nathan Martins, a 77-year-old man with hypertension, dyslipidemia and coronary artery disease, was seen today at your request for followup.    Since last seen in 01/2022, Mr. Martins remains entirely free of chest, arm, neck, jaw or back discomfort with exertion.  His blood pressure is now well controlled with recent changes made in his medical regimen.  He continues to be physically active, and he and his family will get together this Emely.    PAST MEDICAL HISTORY:    1.  Hypertension.  2.  Dyslipidemia.  3.  Coronary artery disease.  a.  Abnormal stress test prior to left wrist surgery.  b.  Diagnostic coronary angiography showing distal occlusion of circumflex with good collaterals.  No significant narrowing in nondominant right coronary or left main.  Significant narrowing in LAD treated with bare-metal stent.  c.  Repeat coronary angiography 2015 showing continued patency at the LAD intervention site.  No significant narrowing in nondominant, right coronary or left main.  Distal occlusion of circumflex with good collaterals.    PHYSICAL EXAMINATION:    GENERAL:  Exam today demonstrates a very pleasant, cooperative and intelligent 77-year-old man accompanied by his wife.  He looks younger than stated age.  VITAL SIGNS:  His blood pressure is 122/78, his heart rate 82.  His height is 1.8 meters, his weight is 80.4 kilograms for a BMI of 26.2.  RESPIRATORY:  His lungs are clear to percussion and auscultation.  CARDIOVASCULAR:  Shows a normal S1 with a normal S2.  There is no S3.  There is no murmur or rub.    MEDICATIONS:    1.  Aspirin 81 daily.  2.  Atorvastatin 80 daily.  3.  Carvedilol 12.5 mg b.i.d.  4.  Hydrochlorothiazide 25 mg daily.  5.   Lisinopril 30 mg daily.  6.  Nitroglycerin sublingual p.r.n.    LABORATORY STUDIES:  His LDL cholesterol today is 57.  His last creatinine was 0.89 in .    ASSESSMENT:  Mr. Martins is asymptomatic with optimal blood pressure and LDL cholesterol levels.  We have reviewed the benefits of a Mediterranean-style diet and a regular exercise program.  I have asked the patient to contact me should he have any symptoms between now and the time of his next visit.    RECOMMENDATIONS:    1.  Continue present medical therapy.  2.  Follow up with me in about 1 year.  3.  Continue followup with Dr. Shepard for noncardiac issues.    We greatly appreciate the opportunity to participate in the care of your patient, Mr. Nando Martins.    Raman Sabillon MD    cc:  Letha Shepard, [facility]  [address] [place]  [address]      Raman Sabillon MD        D: 2022   T: 2022   MT: grey    Name:     NANDO MARTINS  MRN:      [id number]        Account:      [id number]   :      1945           Service Date: 2022       Document: [id number]      Thank you for allowing me to participate in the care of your patient.      Sincerely,     Raman Sabillon MD     [facility]  cc:   No referring provider defined for this encounter.

## 2022-12-09 NOTE — PROGRESS NOTES
Service Date: 12/09/2022    HISTORY OF PRESENT ILLNESS:  Nathan Martins, a 77-year-old man with hypertension, dyslipidemia and coronary artery disease, was seen today at your request for followup.    Since last seen in 01/2022, Mr. Martins remains entirely free of chest, arm, neck, jaw or back discomfort with exertion.  His blood pressure is now well controlled with recent changes made in his medical regimen.  He continues to be physically active, and he and his family will get together this Emely.    PAST MEDICAL HISTORY:    1.  Hypertension.  2.  Dyslipidemia.  3.  Coronary artery disease.  a.  Abnormal stress test prior to left wrist surgery.  b.  Diagnostic coronary angiography showing distal occlusion of circumflex with good collaterals.  No significant narrowing in nondominant right coronary or left main.  Significant narrowing in LAD treated with bare-metal stent.  c.  Repeat coronary angiography 2015 showing continued patency at the LAD intervention site.  No significant narrowing in nondominant, right coronary or left main.  Distal occlusion of circumflex with good collaterals.    PHYSICAL EXAMINATION:    GENERAL:  Exam today demonstrates a very pleasant, cooperative and intelligent 77-year-old man accompanied by his wife.  He looks younger than stated age.  VITAL SIGNS:  His blood pressure is 122/78, his heart rate 82.  His height is 1.8 meters, his weight is 80.4 kilograms for a BMI of 26.2.  RESPIRATORY:  His lungs are clear to percussion and auscultation.  CARDIOVASCULAR:  Shows a normal S1 with a normal S2.  There is no S3.  There is no murmur or rub.    MEDICATIONS:    1.  Aspirin 81 daily.  2.  Atorvastatin 80 daily.  3.  Carvedilol 12.5 mg b.i.d.  4.  Hydrochlorothiazide 25 mg daily.  5.  Lisinopril 30 mg daily.  6.  Nitroglycerin sublingual p.r.n.    LABORATORY STUDIES:  His LDL cholesterol today is 57.  His last creatinine was 0.89 in 2021.    ASSESSMENT:  Mr. Martins is asymptomatic with  optimal blood pressure and LDL cholesterol levels.  We have reviewed the benefits of a Mediterranean-style diet and a regular exercise program.  I have asked the patient to contact me should he have any symptoms between now and the time of his next visit.    RECOMMENDATIONS:    1.  Continue present medical therapy.  2.  Follow up with me in about 1 year.  3.  Continue followup with Dr. Shepard for noncardiac issues.    We greatly appreciate the opportunity to participate in the care of your patient, Mr. Nando Martins.    Raman Sabillon MD    cc:  Letha Shepard, [facility]  [address] [place]  [address]      Raman Sabillon MD        D: 2022   T: 2022   MT: grey    Name:     NANDO MARTINS  MRN:      [id number]        Account:      [id number]   :      1945           Service Date: 2022       Document: [id number]

## 2022-12-20 DIAGNOSIS — I10 ESSENTIAL HYPERTENSION: ICD-10-CM

## 2022-12-20 DIAGNOSIS — I25.10 CORONARY ARTERY DISEASE INVOLVING NATIVE CORONARY ARTERY OF NATIVE HEART WITHOUT ANGINA PECTORIS: ICD-10-CM

## 2022-12-20 RX ORDER — LISINOPRIL 30 MG/1
30 TABLET ORAL DAILY
Qty: 90 TABLET | Refills: 3 | Status: SHIPPED | OUTPATIENT
Start: 2022-12-20 | End: 2024-03-04

## 2022-12-20 RX ORDER — HYDROCHLOROTHIAZIDE 25 MG/1
25 TABLET ORAL DAILY
Qty: 90 TABLET | Refills: 3 | Status: SHIPPED | OUTPATIENT
Start: 2022-12-20 | End: 2024-03-04

## 2022-12-20 RX ORDER — CARVEDILOL 12.5 MG/1
12.5 TABLET ORAL 2 TIMES DAILY WITH MEALS
Qty: 180 TABLET | Refills: 3 | Status: SHIPPED | OUTPATIENT
Start: 2022-12-20 | End: 2024-03-04

## 2022-12-20 RX ORDER — NITROGLYCERIN 0.4 MG/1
0.4 TABLET SUBLINGUAL EVERY 5 MIN PRN
Qty: 25 TABLET | Refills: 3 | Status: SHIPPED | OUTPATIENT
Start: 2022-12-20

## 2023-07-22 ENCOUNTER — HEALTH MAINTENANCE LETTER (OUTPATIENT)
Age: 78
End: 2023-07-22

## 2023-10-27 NOTE — PROGRESS NOTES
"HISTORY OF PRESENT ILLNESS:  stable    Orders this Visit:  No orders of the defined types were placed in this encounter.    No orders of the defined types were placed in this encounter.    There are no discontinued medications.    No diagnosis found.    CURRENT MEDICATIONS:  Current Outpatient Medications   Medication Sig Dispense Refill     aspirin 81 MG tablet Take by mouth daily       atorvastatin (LIPITOR) 80 MG tablet Take 1 tablet (80 mg) by mouth daily 30 tablet 11     Biotin 5000 MCG TABS Take by mouth daily       carvedilol (COREG) 12.5 MG tablet Take 1 tablet (12.5 mg) by mouth 2 times daily (with meals) 180 tablet 3     cholecalciferol (VITAMIN  -D) 1000 UNITS capsule Take 1 capsule by mouth daily       hydrochlorothiazide (HYDRODIURIL) 25 MG tablet Take 1 tablet (25 mg) by mouth daily 90 tablet 3     lisinopril (ZESTRIL) 30 MG tablet Take 1 tablet (30 mg) by mouth daily 90 tablet 3     Multiple Vitamins-Minerals (CENTRUM SILVER 50+MEN PO) Take by mouth daily       nitroGLYcerin (NITROSTAT) 0.4 MG sublingual tablet Place 1 tablet (0.4 mg) under the tongue every 5 minutes as needed for chest pain 25 tablet 3       ALLERGIES   No Known Allergies    PAST MEDICAL, SURGICAL, FAMILY, SOCIAL HISTORY:  History was reviewed and updated as needed, see medical record.    Review of Systems:  A 12-point review of systems was completed, see medical record for detailed review of systems information.    Physical Exam:  Vitals: /78 (BP Location: Right arm, Patient Position: Sitting, Cuff Size: Adult Regular)   Pulse 82   Ht (P) 1.753 m (5' 9\")   Wt 80.4 kg (177 lb 3.2 oz)   SpO2 95%   BMI (P) 26.17 kg/m      Constitutional:           Skin:           Head:           Eyes:           ENT:           Neck:           Chest:           Cardiac:                    Abdomen:           Vascular:                                        Extremities and Back:           Neurological:           ASSESSMENT: stable   "     RECOMMENDATIONS: follow-up in one year      Recent Lab Results:  LIPID RESULTS:  Lab Results   Component Value Date    CHOL 120 12/08/2022    CHOL 133 01/21/2019    HDL 53 12/08/2022    HDL 55 01/21/2019    LDL 57 12/08/2022    LDL 68.8 01/21/2019    TRIG 48 12/08/2022    TRIG 46 01/21/2019    CHOLHDLRATIO 2.2 10/29/2015       LIVER ENZYME RESULTS:  Lab Results   Component Value Date    AST 22.0 01/21/2019    ALT 23 12/08/2022    ALT 34.0 01/21/2019       CBC RESULTS:  Lab Results   Component Value Date    WBC 8.6 09/03/2021    WBC 9.0 01/21/2019    RBC 4.48 09/03/2021    RBC 4.59 01/21/2019    HGB 14.6 09/03/2021    HGB 14.6 01/21/2019    HCT 43.4 09/03/2021    HCT 44.0 01/21/2019    MCV 97 09/03/2021    MCV 95.9 01/21/2019    MCH 32.6 09/03/2021    MCH 31.8 01/21/2019    MCHC 33.6 09/03/2021    MCHC 33.2 01/21/2019    RDW 13.3 09/03/2021    RDW 13.6 01/21/2019     09/03/2021     01/21/2019     12/05/2012       BMP RESULTS:  Lab Results   Component Value Date     10/07/2021     01/21/2019    POTASSIUM 4.3 10/07/2021    POTASSIUM 4.4 01/21/2019    CHLORIDE 105 10/07/2021    CHLORIDE 107 01/21/2019    CO2 26 10/07/2021    CO2 25 12/05/2012    ANIONGAP 7 10/07/2021    ANIONGAP 10 12/05/2012     (H) 10/07/2021     (A) 01/21/2019    BUN 20 10/07/2021    BUN 16 01/21/2019    CR 0.89 10/07/2021    CR 0.84 01/21/2019    GFRESTIMATED 83 10/07/2021    GFRESTIMATED >90 12/05/2012    GFRESTBLACK >90 12/05/2012    TORY 8.9 10/07/2021    TORY 8.7 01/21/2019        A1C RESULTS:  No results found for: A1C    INR RESULTS:  Lab Results   Component Value Date    INR 1.03 12/05/2012       We greatly appreciate the opportunity to be involved in the care of your patient, Nathan Martins.    Sincerely,  Raman Sabillon MD      CC  No referring provider defined for this encounter.                                                                      None

## 2024-01-01 ENCOUNTER — PATIENT OUTREACH (OUTPATIENT)
Dept: CARDIOLOGY | Facility: CLINIC | Age: 79
End: 2024-01-01
Payer: COMMERCIAL

## 2024-03-01 ENCOUNTER — LAB (OUTPATIENT)
Dept: LAB | Facility: CLINIC | Age: 79
End: 2024-03-01
Payer: COMMERCIAL

## 2024-03-01 DIAGNOSIS — E78.5 DYSLIPIDEMIA: ICD-10-CM

## 2024-03-01 DIAGNOSIS — I42.9 CARDIOMYOPATHY (H): ICD-10-CM

## 2024-03-01 DIAGNOSIS — Z95.5 STENTED CORONARY ARTERY: ICD-10-CM

## 2024-03-01 DIAGNOSIS — I10 PRIMARY HYPERTENSION: ICD-10-CM

## 2024-03-01 DIAGNOSIS — I10 ESSENTIAL HYPERTENSION: ICD-10-CM

## 2024-03-01 LAB
ALT SERPL W P-5'-P-CCNC: 47 U/L (ref 0–70)
ANION GAP SERPL CALCULATED.3IONS-SCNC: 11 MMOL/L (ref 7–15)
BUN SERPL-MCNC: 18 MG/DL (ref 8–23)
CALCIUM SERPL-MCNC: 8.8 MG/DL (ref 8.8–10.2)
CHLORIDE SERPL-SCNC: 103 MMOL/L (ref 98–107)
CHOLEST SERPL-MCNC: 130 MG/DL
CREAT SERPL-MCNC: 0.77 MG/DL (ref 0.67–1.17)
DEPRECATED HCO3 PLAS-SCNC: 24 MMOL/L (ref 22–29)
EGFRCR SERPLBLD CKD-EPI 2021: >90 ML/MIN/1.73M2
ERYTHROCYTE [DISTWIDTH] IN BLOOD BY AUTOMATED COUNT: 15.8 % (ref 10–15)
FASTING STATUS PATIENT QL REPORTED: YES
GLUCOSE SERPL-MCNC: 143 MG/DL (ref 70–99)
HCT VFR BLD AUTO: 26.4 % (ref 40–53)
HDLC SERPL-MCNC: 42 MG/DL
HGB BLD-MCNC: 8.3 G/DL (ref 13.3–17.7)
LDLC SERPL CALC-MCNC: 80 MG/DL
MCH RBC QN AUTO: 25.2 PG (ref 26.5–33)
MCHC RBC AUTO-ENTMCNC: 31.4 G/DL (ref 31.5–36.5)
MCV RBC AUTO: 80 FL (ref 78–100)
NONHDLC SERPL-MCNC: 88 MG/DL
PLATELET # BLD AUTO: 463 10E3/UL (ref 150–450)
POTASSIUM SERPL-SCNC: 3.9 MMOL/L (ref 3.4–5.3)
RBC # BLD AUTO: 3.29 10E6/UL (ref 4.4–5.9)
SODIUM SERPL-SCNC: 138 MMOL/L (ref 135–145)
TRIGL SERPL-MCNC: 42 MG/DL
WBC # BLD AUTO: 10.5 10E3/UL (ref 4–11)

## 2024-03-01 PROCEDURE — 36415 COLL VENOUS BLD VENIPUNCTURE: CPT | Performed by: INTERNAL MEDICINE

## 2024-03-01 PROCEDURE — 84460 ALANINE AMINO (ALT) (SGPT): CPT | Performed by: INTERNAL MEDICINE

## 2024-03-01 PROCEDURE — 85027 COMPLETE CBC AUTOMATED: CPT | Performed by: INTERNAL MEDICINE

## 2024-03-01 PROCEDURE — 80048 BASIC METABOLIC PNL TOTAL CA: CPT | Performed by: INTERNAL MEDICINE

## 2024-03-01 PROCEDURE — 80061 LIPID PANEL: CPT | Performed by: INTERNAL MEDICINE

## 2024-03-04 ENCOUNTER — OFFICE VISIT (OUTPATIENT)
Dept: CARDIOLOGY | Facility: CLINIC | Age: 79
End: 2024-03-04
Payer: COMMERCIAL

## 2024-03-04 VITALS
DIASTOLIC BLOOD PRESSURE: 58 MMHG | HEART RATE: 76 BPM | BODY MASS INDEX: 23.92 KG/M2 | WEIGHT: 161.5 LBS | SYSTOLIC BLOOD PRESSURE: 104 MMHG | HEIGHT: 69 IN

## 2024-03-04 DIAGNOSIS — I25.10 CORONARY ARTERY DISEASE INVOLVING NATIVE CORONARY ARTERY OF NATIVE HEART WITHOUT ANGINA PECTORIS: ICD-10-CM

## 2024-03-04 DIAGNOSIS — I10 ESSENTIAL HYPERTENSION: ICD-10-CM

## 2024-03-04 DIAGNOSIS — E78.5 DYSLIPIDEMIA: Primary | ICD-10-CM

## 2024-03-04 DIAGNOSIS — R01.1 SYSTOLIC MURMUR: ICD-10-CM

## 2024-03-04 PROCEDURE — 99214 OFFICE O/P EST MOD 30 MIN: CPT | Performed by: INTERNAL MEDICINE

## 2024-03-04 RX ORDER — ATORVASTATIN CALCIUM 80 MG/1
80 TABLET, FILM COATED ORAL DAILY
Qty: 90 TABLET | Refills: 11 | Status: SHIPPED | OUTPATIENT
Start: 2024-03-04

## 2024-03-04 RX ORDER — LISINOPRIL 30 MG/1
30 TABLET ORAL DAILY
Qty: 90 TABLET | Refills: 3 | Status: SHIPPED | OUTPATIENT
Start: 2024-03-04 | End: 2024-10-07

## 2024-03-04 RX ORDER — CARVEDILOL 12.5 MG/1
12.5 TABLET ORAL 2 TIMES DAILY WITH MEALS
Qty: 180 TABLET | Refills: 3 | Status: SHIPPED | OUTPATIENT
Start: 2024-03-04 | End: 2024-10-07

## 2024-03-04 RX ORDER — HYDROCHLOROTHIAZIDE 25 MG/1
25 TABLET ORAL DAILY
Qty: 90 TABLET | Refills: 3 | Status: SHIPPED | OUTPATIENT
Start: 2024-03-04 | End: 2024-10-07

## 2024-03-04 NOTE — PROGRESS NOTES
HISTORY OF PRESENT ILLNESS:  Nathan Martins a 78 year old man with hypertension, dyslipidemia and coronary artery disease, was seen today at your request for followup.    Since last seen 12/9/2022 the patient remains free of cardiovascular symptoms.  He specifically denies chest arm neck jaw or back discomfort with exertion dyspnea syncope or lightheadedness.  The patient estimates he has lost about 20 pounds in last 2 years but is free of any systemic complaints.  He denied fever rash night sweats adenopathy or appetite disturbance.    PAST MEDICAL HISTORY:    1.  Hypertension.  2.  Dyslipidemia.  3.  Coronary artery disease.  a.  Abnormal stress test prior to left wrist surgery.  b.  Diagnostic coronary angiography showing distal occlusion of circumflex with good collaterals.  No significant narrowing in nondominant right coronary or left main.  Significant narrowing in LAD treated with bare-metal stent.  c.  Repeat coronary angiography 2015 showing continued patency at the LAD intervention site.  No significant narrowing in nondominant, right coronary or left main.  Distal occlusion of circumflex with good collaterals.  Orders this Visit:  No orders of the defined types were placed in this encounter.    No orders of the defined types were placed in this encounter.    There are no discontinued medications.    No diagnosis found.    CURRENT MEDICATIONS:  Current Outpatient Medications   Medication Sig Dispense Refill    aspirin 81 MG tablet Take by mouth daily      atorvastatin (LIPITOR) 80 MG tablet Take 1 tablet (80 mg) by mouth daily 90 tablet 11    Biotin 5000 MCG TABS Take by mouth daily      carvedilol (COREG) 12.5 MG tablet Take 1 tablet (12.5 mg) by mouth 2 times daily (with meals) 180 tablet 3    cholecalciferol (VITAMIN  -D) 1000 UNITS capsule Take 1 capsule by mouth daily      hydrochlorothiazide (HYDRODIURIL) 25 MG tablet Take 1 tablet (25 mg) by mouth daily 90 tablet 3    lisinopril (ZESTRIL) 30 MG  "tablet Take 1 tablet (30 mg) by mouth daily 90 tablet 3    Multiple Vitamins-Minerals (CENTRUM SILVER 50+MEN PO) Take by mouth daily      nitroGLYcerin (NITROSTAT) 0.4 MG sublingual tablet Place 1 tablet (0.4 mg) under the tongue every 5 minutes as needed for chest pain 25 tablet 3       ALLERGIES   No Known Allergies    PAST MEDICAL, SURGICAL, FAMILY, SOCIAL HISTORY:  History was reviewed and updated as needed, see medical record.        Review of Systems:  A 12-point review of systems was completed, see medical record for detailed review of systems information.    Physical Exam:  Vitals: Ht 1.753 m (5' 9\")   BMI 26.17 kg/m      Constitutional: No apparent distress    HEENT: pupils equal round reactive to light, thyroid normal size, JVP is normal    Chest: Clear to percussion and auscultation    Cardiac: Normal S1, normal S2 no S3, murmur 2/6 systolic murmur    Extremitiies: no edema.    Neurological:  Cranial nerves II through XII are intact, strength equal and symmetrical, displays normal insight and judgment        ASSESSMENT: The patient remains free of cardiovascular symptoms on guideline directed medical therapy with optimal blood pressure acceptable LDL cholesterol levels since last seen.  I asked the patient and his wife to monitor his blood pressure at home and let me know should he develop any lightheadedness as his current systolic blood pressure.  We have discussed the benefits of a regular exercise program and a Mediterranean-style diet.  I have asked him to discuss his weight loss with his primary care physician.  He has not had a recent echocardiogram and although he reports having been told he had a murmur in the past I believe formal evaluation with an echocardiogram is appropriate.    RECOMMENDATIONS:   1) transthoracic echocardiogram  2) Regular  exercise 30 to 45 minutes 5 to 7 times  per week  3)  Mediterranean style  diet  4) Follow  blood  pressure with home  monitoring.  Notify me for any " "lightheadedness which may result in adjustment of his blood pressure medications.  5) Discuss weight  loss  with primary care MD  6. Follow up in one year, earlier if symptoms or abnormality on TTE        Recent Lab Results:  LIPID RESULTS:  Lab Results   Component Value Date    CHOL 130 03/01/2024    CHOL 133 01/21/2019    HDL 42 03/01/2024    HDL 55 01/21/2019    LDL 80 03/01/2024    LDL 68.8 01/21/2019    TRIG 42 03/01/2024    TRIG 46 01/21/2019    CHOLHDLRATIO 2.2 10/29/2015       LIVER ENZYME RESULTS:  Lab Results   Component Value Date    AST 22.0 01/21/2019    ALT 47 03/01/2024    ALT 34.0 01/21/2019       CBC RESULTS:  Lab Results   Component Value Date    WBC 10.5 03/01/2024    WBC 9.0 01/21/2019    RBC 3.29 (L) 03/01/2024    RBC 4.59 01/21/2019    HGB 8.3 (L) 03/01/2024    HGB 14.6 01/21/2019    HCT 26.4 (L) 03/01/2024    HCT 44.0 01/21/2019    MCV 80 03/01/2024    MCV 95.9 01/21/2019    MCH 25.2 (L) 03/01/2024    MCH 31.8 01/21/2019    MCHC 31.4 (L) 03/01/2024    MCHC 33.2 01/21/2019    RDW 15.8 (H) 03/01/2024    RDW 13.6 01/21/2019     (H) 03/01/2024     01/21/2019     12/05/2012       BMP RESULTS:  Lab Results   Component Value Date     03/01/2024     01/21/2019    POTASSIUM 3.9 03/01/2024    POTASSIUM 4.3 10/07/2021    POTASSIUM 4.4 01/21/2019    CHLORIDE 103 03/01/2024    CHLORIDE 105 10/07/2021    CHLORIDE 107 01/21/2019    CO2 24 03/01/2024    CO2 26 10/07/2021    CO2 25 12/05/2012    ANIONGAP 11 03/01/2024    ANIONGAP 7 10/07/2021    ANIONGAP 10 12/05/2012     (H) 03/01/2024     (H) 10/07/2021     (A) 01/21/2019    BUN 18.0 03/01/2024    BUN 20 10/07/2021    BUN 16 01/21/2019    CR 0.77 03/01/2024    CR 0.84 01/21/2019    GFRESTIMATED >90 03/01/2024    GFRESTIMATED >90 12/05/2012    GFRESTBLACK >90 12/05/2012    TORY 8.8 03/01/2024    TORY 8.7 01/21/2019        A1C RESULTS:  No results found for: \"A1C\"    INR RESULTS:  Lab Results   Component " Value Date    INR 1.03 12/05/2012       We greatly appreciate the opportunity to be involved in the care of your patient, Nathan Martins.    Sincerely,  Raman Sabillon MD      CC  No referring provider defined for this encounter.

## 2024-03-04 NOTE — LETTER
3/4/2024    [place]  [address] [facility] [id number]    RE: Nathan Martins       Dear Colleague,     I had the pleasure of seeing Nathan Martins in the [facility].  HISTORY OF PRESENT ILLNESS:  Nathan Martins a 78 year old man with hypertension, dyslipidemia and coronary artery disease, was seen today at your request for followup     PAST MEDICAL HISTORY:    1.  Hypertension.  2.  Dyslipidemia.  3.  Coronary artery disease.  a.  Abnormal stress test prior to left wrist surgery.  b.  Diagnostic coronary angiography showing distal occlusion of circumflex with good collaterals.  No significant narrowing in nondominant right coronary or left main.  Significant narrowing in LAD treated with bare-metal stent.  c.  Repeat coronary angiography 2015 showing continued patency at the LAD intervention site.  No significant narrowing in nondominant, right coronary or left main.  Distal occlusion of circumflex with good collaterals.  Orders this Visit:  No orders of the defined types were placed in this encounter.    No orders of the defined types were placed in this encounter.    There are no discontinued medications.    No diagnosis found.    CURRENT MEDICATIONS:  Current Outpatient Medications   Medication Sig Dispense Refill    aspirin 81 MG tablet Take by mouth daily      atorvastatin (LIPITOR) 80 MG tablet Take 1 tablet (80 mg) by mouth daily 90 tablet 11    Biotin 5000 MCG TABS Take by mouth daily      carvedilol (COREG) 12.5 MG tablet Take 1 tablet (12.5 mg) by mouth 2 times daily (with meals) 180 tablet 3    cholecalciferol (VITAMIN  -D) 1000 UNITS capsule Take 1 capsule by mouth daily      hydrochlorothiazide (HYDRODIURIL) 25 MG tablet Take 1 tablet (25 mg) by mouth daily 90 tablet 3    lisinopril (ZESTRIL) 30 MG tablet Take 1 tablet (30 mg) by mouth daily 90 tablet 3    Multiple Vitamins-Minerals (CENTRUM SILVER 50+MEN PO) Take by mouth daily      nitroGLYcerin  "(NITROSTAT) 0.4 MG sublingual tablet Place 1 tablet (0.4 mg) under the tongue every 5 minutes as needed for chest pain 25 tablet 3       ALLERGIES   No Known Allergies    PAST MEDICAL, SURGICAL, FAMILY, SOCIAL HISTORY:  History was reviewed and updated as needed, see medical record.        Review of Systems:  A 12-point review of systems was completed, see medical record for detailed review of systems information.    Physical Exam:  Vitals: Ht 1.753 m (5' 9\")   BMI 26.17 kg/m      Constitutional: No apparent distress    HEENT: pupils equal round reactive to light, thyroid normal size, JVP is normal    Chest: Clear to percussion and auscultation    Cardiac: Normal S1, normal S2 no S3, murmur 2/6 systolic    Abdomen: Scaphoid.  Liver percusses to 6 cm spleen is not palpable aorta is not tender or enlarged    Extremitiies: no edema.    Neurological:  Cranial nerves II through XII are intact, strength equal and symmetrical, displays normal insight and judgment        ASSESSMENT: Nathan Martins is a 78 year old male with          We reviewed the benefits of a regular exercise program, a Mediterranean-style weight loss diet, and contacting us for any changes in between now and the time of her next visit.     RECOMMENDATIONS:   1) TTE to  evaluate murmur  2) Regular  exercise 30 to 45 minutes 5 to 7 times  per week  3)  Mediterranean style  diet  4) Follow  blood  pressure with home  monitoring  and may adjust  dose  should  he develop lightheadedness  5) Discuss weight  loss  with primary care MD        Recent Lab Results:  LIPID RESULTS:  Lab Results   Component Value Date    CHOL 130 03/01/2024    CHOL 133 01/21/2019    HDL 42 03/01/2024    HDL 55 01/21/2019    LDL 80 03/01/2024    LDL 68.8 01/21/2019    TRIG 42 03/01/2024    TRIG 46 01/21/2019    CHOLHDLRATIO 2.2 10/29/2015       LIVER ENZYME RESULTS:  Lab Results   Component Value Date    AST 22.0 01/21/2019    ALT 47 03/01/2024    ALT 34.0 01/21/2019       CBC " "RESULTS:  Lab Results   Component Value Date    WBC 10.5 03/01/2024    WBC 9.0 01/21/2019    RBC 3.29 (L) 03/01/2024    RBC 4.59 01/21/2019    HGB 8.3 (L) 03/01/2024    HGB 14.6 01/21/2019    HCT 26.4 (L) 03/01/2024    HCT 44.0 01/21/2019    MCV 80 03/01/2024    MCV 95.9 01/21/2019    MCH 25.2 (L) 03/01/2024    MCH 31.8 01/21/2019    MCHC 31.4 (L) 03/01/2024    MCHC 33.2 01/21/2019    RDW 15.8 (H) 03/01/2024    RDW 13.6 01/21/2019     (H) 03/01/2024     01/21/2019     12/05/2012       BMP RESULTS:  Lab Results   Component Value Date     03/01/2024     01/21/2019    POTASSIUM 3.9 03/01/2024    POTASSIUM 4.3 10/07/2021    POTASSIUM 4.4 01/21/2019    CHLORIDE 103 03/01/2024    CHLORIDE 105 10/07/2021    CHLORIDE 107 01/21/2019    CO2 24 03/01/2024    CO2 26 10/07/2021    CO2 25 12/05/2012    ANIONGAP 11 03/01/2024    ANIONGAP 7 10/07/2021    ANIONGAP 10 12/05/2012     (H) 03/01/2024     (H) 10/07/2021     (A) 01/21/2019    BUN 18.0 03/01/2024    BUN 20 10/07/2021    BUN 16 01/21/2019    CR 0.77 03/01/2024    CR 0.84 01/21/2019    GFRESTIMATED >90 03/01/2024    GFRESTIMATED >90 12/05/2012    GFRESTBLACK >90 12/05/2012    TORY 8.8 03/01/2024    TORY 8.7 01/21/2019        A1C RESULTS:  No results found for: \"A1C\"    INR RESULTS:  Lab Results   Component Value Date    INR 1.03 12/05/2012       We greatly appreciate the opportunity to be involved in the care of your patient, Nathan Martins.    Sincerely,  Raman Sabillon MD      CC  No referring provider defined for this encounter.        "

## 2024-03-12 ENCOUNTER — HOSPITAL ENCOUNTER (OUTPATIENT)
Dept: CARDIOLOGY | Facility: CLINIC | Age: 79
Discharge: HOME | End: 2024-03-12
Attending: INTERNAL MEDICINE | Admitting: INTERNAL MEDICINE
Payer: COMMERCIAL

## 2024-03-12 DIAGNOSIS — I25.10 CORONARY ARTERY DISEASE INVOLVING NATIVE CORONARY ARTERY OF NATIVE HEART WITHOUT ANGINA PECTORIS: ICD-10-CM

## 2024-03-12 DIAGNOSIS — E78.5 DYSLIPIDEMIA: ICD-10-CM

## 2024-03-12 DIAGNOSIS — I10 ESSENTIAL HYPERTENSION: ICD-10-CM

## 2024-03-12 DIAGNOSIS — R01.1 SYSTOLIC MURMUR: ICD-10-CM

## 2024-03-12 LAB — LVEF ECHO: NORMAL

## 2024-03-12 PROCEDURE — 93306 TTE W/DOPPLER COMPLETE: CPT | Mod: 26 | Performed by: INTERNAL MEDICINE

## 2024-03-12 PROCEDURE — 255N000002 HC RX 255 OP 636: Performed by: INTERNAL MEDICINE

## 2024-03-12 PROCEDURE — C8929 TTE W OR WO FOL WCON,DOPPLER: HCPCS

## 2024-03-12 RX ADMIN — HUMAN ALBUMIN MICROSPHERES AND PERFLUTREN 3 ML: 10; .22 INJECTION, SOLUTION INTRAVENOUS at 10:22

## 2024-03-14 ENCOUNTER — TELEPHONE (OUTPATIENT)
Dept: CARDIOLOGY | Facility: CLINIC | Age: 79
End: 2024-03-14
Payer: COMMERCIAL

## 2024-03-14 DIAGNOSIS — I25.10 CORONARY ARTERY DISEASE INVOLVING NATIVE CORONARY ARTERY OF NATIVE HEART WITHOUT ANGINA PECTORIS: Primary | ICD-10-CM

## 2024-03-14 DIAGNOSIS — I42.9 CARDIOMYOPATHY (H): ICD-10-CM

## 2024-03-14 DIAGNOSIS — I35.0 AORTIC STENOSIS: ICD-10-CM

## 2024-03-14 NOTE — TELEPHONE ENCOUNTER
Patient and wife notified of the aortic stenosis (mild) on TTE and to follow up in 1 year with an echocardiogram orders placed. Patient verbalized understanding.  Lauren Correa RN on 3/14/2024 at 11:42 AM

## 2024-03-14 NOTE — TELEPHONE ENCOUNTER
----- Message from Giana Steven RN sent at 3/14/2024  8:21 AM CDT -----  Regarding: FW: TTE    ----- Message -----  From: Raman Sabillon MD  Sent: 3/13/2024  10:09 PM CDT  To: Samanta Sanches RN  Subject: TTE                                              Hi Samanta  Please tell Mr. Martins he has mild aortic stenosis. There is no indication for intervention, but we should repeat the TTE in one year to reassess. Thanks

## 2024-05-20 ENCOUNTER — OFFICE VISIT (OUTPATIENT)
Dept: CARDIOLOGY | Facility: CLINIC | Age: 79
End: 2024-05-20
Payer: COMMERCIAL

## 2024-05-20 VITALS
DIASTOLIC BLOOD PRESSURE: 60 MMHG | HEIGHT: 69 IN | BODY MASS INDEX: 23.19 KG/M2 | HEART RATE: 76 BPM | WEIGHT: 156.6 LBS | OXYGEN SATURATION: 98 % | SYSTOLIC BLOOD PRESSURE: 104 MMHG

## 2024-05-20 DIAGNOSIS — I35.0 NONRHEUMATIC AORTIC VALVE STENOSIS: ICD-10-CM

## 2024-05-20 DIAGNOSIS — I25.10 CORONARY ARTERY DISEASE INVOLVING NATIVE CORONARY ARTERY OF NATIVE HEART WITHOUT ANGINA PECTORIS: Primary | ICD-10-CM

## 2024-05-20 DIAGNOSIS — E78.5 DYSLIPIDEMIA: ICD-10-CM

## 2024-05-20 DIAGNOSIS — I10 ESSENTIAL HYPERTENSION: ICD-10-CM

## 2024-05-20 PROCEDURE — 99214 OFFICE O/P EST MOD 30 MIN: CPT | Performed by: INTERNAL MEDICINE

## 2024-05-20 RX ORDER — IRON, FOLIC ACID, VITAMIN/MINERAL SUPPLEMENT 65-1MG(24)
1 KIT ORAL DAILY
COMMUNITY

## 2024-05-20 NOTE — PROGRESS NOTES
HISTORY OF PRESENT ILLNESS:  Nathan Martins a 79 year old male with coronary artery disease, moderate aortic stenosis, hypertension, dyslipidemia, and newly diagnosed anemia was seen today for follow-up.    When I last saw the patient on 3/4/2024, he was free of chest arm neck jaw or back discomfort with exertion.  He had weight loss of about 20 pounds and mild exertional dyspnea which has slowly worsened over the last 2 months.  We performed an echocardiogram that showed an ejection fraction of 45% with moderate aortic stenosis ( MSG 19 mmHg/MAGGI 1.1cm2/DI 0.27) his family was concerned about the possibility that heart disease was contributing significantly to his exertional dyspnea.    The patient remains free of lightheadedness, syncope, chest discomfort, orthopnea, PND, pedal edema.  He saw a hematology/oncology specialist in the [facility] system who plans a bone marrow and CT scan to evaluate his symptoms    PAST MEDICAL HISTORY:    1.  Hypertension.  2.  Dyslipidemia.  3.  Coronary artery disease.  a.  Abnormal stress test prior to left wrist surgery.  b.  Diagnostic coronary angiography showing distal occlusion of circumflex with good collaterals.  No significant narrowing in nondominant right coronary or left main.  Significant narrowing in LAD treated with bare-metal stent.  c.  Repeat coronary angiography 2015 showing continued patency at the LAD intervention site.  No significant narrowing in nondominant, right coronary or left main.  Distal occlusion of circumflex with good collaterals.  4. Moderate aortic stenosis MSG 19 mmHg MAGGI 1.1 cm2/DI 0.27     LVEF 45 %  5. Anemia/weight loss :  HB 8 undergoing evaluation with hematologist    Orders this Visit:  No orders of the defined types were placed in this encounter.    Orders Placed This Encounter   Medications    Fe-Succ Ac-B [id number] (IROSPAN 24/6) MISC     Sig: Take by mouth. 65mg 3 times a day    Bioflavonoid Products (VITAMIN C) CHEW     Sig: Take  "300 mg by mouth daily     There are no discontinued medications.    No diagnosis found.    CURRENT MEDICATIONS:  Current Outpatient Medications   Medication Sig Dispense Refill    aspirin 81 MG tablet Take by mouth daily      atorvastatin (LIPITOR) 80 MG tablet Take 1 tablet (80 mg) by mouth daily 90 tablet 11    Bioflavonoid Products (VITAMIN C) CHEW Take 300 mg by mouth daily      Biotin 5000 MCG TABS Take by mouth daily      carvedilol (COREG) 12.5 MG tablet Take 1 tablet (12.5 mg) by mouth 2 times daily (with meals) 180 tablet 3    cholecalciferol (VITAMIN  -D) 1000 UNITS capsule Take 1 capsule by mouth daily      Fe-Succ Ac-B [id number] (IROSPAN 24/6) MISC Take by mouth. 65mg 3 times a day      lisinopril (ZESTRIL) 30 MG tablet Take 1 tablet (30 mg) by mouth daily 90 tablet 3    Multiple Vitamins-Minerals (CENTRUM SILVER 50+MEN PO) Take by mouth daily      nitroGLYcerin (NITROSTAT) 0.4 MG sublingual tablet Place 1 tablet (0.4 mg) under the tongue every 5 minutes as needed for chest pain 25 tablet 3    hydrochlorothiazide (HYDRODIURIL) 25 MG tablet Take 1 tablet (25 mg) by mouth daily (Patient not taking: Reported on 5/20/2024) 90 tablet 3       ALLERGIES   No Known Allergies    PAST MEDICAL, SURGICAL, FAMILY, SOCIAL HISTORY:  History was reviewed and updated as needed, see medical record.        Review of Systems:  A 12-point review of systems was completed, see medical record for detailed review of systems information.    Physical Exam:  Vitals: /60 (BP Location: Right arm, Patient Position: Sitting, Cuff Size: Adult Regular)   Pulse 76   Ht 1.753 m (5' 9\")   Wt 71 kg (156 lb 9.6 oz)   SpO2 98%   BMI 23.13 kg/m      Constitutional: No apparent distress    HEENT: pupils equal round reactive to light, thyroid normal size, JVP is normal    Chest: Clear to percussion and auscultation    Cardiac: Normal S1, normal S2 no S3, no murmur or click    Abdomen: Scaphoid.  Liver percusses to 6 cm spleen is " not palpable aorta is not tender or enlarged    Extremitiies: no edema.    Neurological:  Cranial nerves II through XII are intact, strength equal and symmetrical, displays normal insight and judgment        ASSESSMENT: I am doubtful that the patient  moderate aortic stenosis and mild left ventricular dysfunction ( LVEF 45%)  is the cause for his exertional dyspnea      My biggest concern at this point is the fact the patient's hemoglobin has dropped from 14 down to 8 without overt blood loss accompanied by weight loss.  I share his hematology/oncology physician's concern about the potential of  underlying systemic disease/malignancy and would not advise any further cardiac testing at this time.  I have told the patient that should he experience lightheadedness or dizziness, we may need to cut his ACE inhibitor and beta-blocker If at any point his hematology/oncology doctor feels that the patient's symptoms are not explained by systemic illness/anemia we will be happy to reevaluate at an earlier time.  I discussed my impression and recommendations with the patient and his wife today.    RECOMMENDATIONS:   1) No further cardiac testing  2) Would reduce or stop  lisinopril if dizziness with low blood pressure  3) follow up with me  in 3/2025  with TTE  ( follow up has been previously scheduled)         Recent Lab Results:  LIPID RESULTS:  Lab Results   Component Value Date    CHOL 130 03/01/2024    CHOL 133 01/21/2019    HDL 42 03/01/2024    HDL 55 01/21/2019    LDL 80 03/01/2024    LDL 68.8 01/21/2019    TRIG 42 03/01/2024    TRIG 46 01/21/2019    CHOLHDLRATIO 2.2 10/29/2015       LIVER ENZYME RESULTS:  Lab Results   Component Value Date    AST 22.0 01/21/2019    ALT 47 03/01/2024    ALT 34.0 01/21/2019       CBC RESULTS:  Lab Results   Component Value Date    WBC 10.5 03/01/2024    WBC 9.0 01/21/2019    RBC 3.29 (L) 03/01/2024    RBC 4.59 01/21/2019    HGB 8.3 (L) 03/01/2024    HGB 14.6 01/21/2019    HCT 26.4 (L)  "03/01/2024    HCT 44.0 01/21/2019    MCV 80 03/01/2024    MCV 95.9 01/21/2019    MCH 25.2 (L) 03/01/2024    MCH 31.8 01/21/2019    MCHC 31.4 (L) 03/01/2024    MCHC 33.2 01/21/2019    RDW 15.8 (H) 03/01/2024    RDW 13.6 01/21/2019     (H) 03/01/2024     01/21/2019     12/05/2012       BMP RESULTS:  Lab Results   Component Value Date     03/01/2024     01/21/2019    POTASSIUM 3.9 03/01/2024    POTASSIUM 4.3 10/07/2021    POTASSIUM 4.4 01/21/2019    CHLORIDE 103 03/01/2024    CHLORIDE 105 10/07/2021    CHLORIDE 107 01/21/2019    CO2 24 03/01/2024    CO2 26 10/07/2021    CO2 25 12/05/2012    ANIONGAP 11 03/01/2024    ANIONGAP 7 10/07/2021    ANIONGAP 10 12/05/2012     (H) 03/01/2024     (H) 10/07/2021     (A) 01/21/2019    BUN 18.0 03/01/2024    BUN 20 10/07/2021    BUN 16 01/21/2019    CR 0.77 03/01/2024    CR 0.84 01/21/2019    GFRESTIMATED >90 03/01/2024    GFRESTIMATED >90 12/05/2012    GFRESTBLACK >90 12/05/2012    TORY 8.8 03/01/2024    TORY 8.7 01/21/2019        A1C RESULTS:  No results found for: \"A1C\"    INR RESULTS:  Lab Results   Component Value Date    INR 1.03 12/05/2012       We greatly appreciate the opportunity to be involved in the care of your patient, Nathan SAILAJA Martins.    Sincerely,  Raman Sabillon MD      CC  No referring provider defined for this encounter.                                                                     "

## 2024-05-20 NOTE — LETTER
5/20/2024    [place]  [id number]  [facility] [address]    RE: Nathan Martins       Dear Colleague,     I had the pleasure of seeing Nathan Martins in the [facility].  HISTORY OF PRESENT ILLNESS:  Nathan Martins a 79 year old male with anemia  saw hematologist      PAST MEDICAL HISTORY:    1.  Hypertension.  2.  Dyslipidemia.  3.  Coronary artery disease.  a.  Abnormal stress test prior to left wrist surgery.  b.  Diagnostic coronary angiography showing distal occlusion of circumflex with good collaterals.  No significant narrowing in nondominant right coronary or left main.  Significant narrowing in LAD treated with bare-metal stent.  c.  Repeat coronary angiography 2015 showing continued patency at the LAD intervention site.  No significant narrowing in nondominant, right coronary or left main.  Distal occlusion of circumflex with good collaterals.  4. Moderate aortic stenosis MSG 19 mmHg MAGGI 1.1 cm2/DI 0.27    Orders this Visit:  No orders of the defined types were placed in this encounter.    Orders Placed This Encounter   Medications    Fe-Succ Ac-B Dqbme-I-Rz-FA (IROSPAN 24/6) MISC     Sig: Take by mouth. 65mg 3 times a day    Bioflavonoid Products (VITAMIN C) CHEW     Sig: Take 300 mg by mouth daily     There are no discontinued medications.    No diagnosis found.    CURRENT MEDICATIONS:  Current Outpatient Medications   Medication Sig Dispense Refill    aspirin 81 MG tablet Take by mouth daily      atorvastatin (LIPITOR) 80 MG tablet Take 1 tablet (80 mg) by mouth daily 90 tablet 11    Bioflavonoid Products (VITAMIN C) CHEW Take 300 mg by mouth daily      Biotin 5000 MCG TABS Take by mouth daily      carvedilol (COREG) 12.5 MG tablet Take 1 tablet (12.5 mg) by mouth 2 times daily (with meals) 180 tablet 3    cholecalciferol (VITAMIN  -D) 1000 UNITS capsule Take 1 capsule by mouth daily      Fe-Succ Ac-B Fakpy-K-Gf-FA (IROSPAN 24/6) MISC Take by mouth. 65mg 3  "times a day      lisinopril (ZESTRIL) 30 MG tablet Take 1 tablet (30 mg) by mouth daily 90 tablet 3    Multiple Vitamins-Minerals (CENTRUM SILVER 50+MEN PO) Take by mouth daily      nitroGLYcerin (NITROSTAT) 0.4 MG sublingual tablet Place 1 tablet (0.4 mg) under the tongue every 5 minutes as needed for chest pain 25 tablet 3    hydrochlorothiazide (HYDRODIURIL) 25 MG tablet Take 1 tablet (25 mg) by mouth daily (Patient not taking: Reported on 5/20/2024) 90 tablet 3       ALLERGIES   No Known Allergies    PAST MEDICAL, SURGICAL, FAMILY, SOCIAL HISTORY:  History was reviewed and updated as needed, see medical record.        Review of Systems:  A 12-point review of systems was completed, see medical record for detailed review of systems information.    Physical Exam:  Vitals: /60 (BP Location: Right arm, Patient Position: Sitting, Cuff Size: Adult Regular)   Pulse 76   Ht 1.753 m (5' 9\")   Wt 71 kg (156 lb 9.6 oz)   SpO2 98%   BMI 23.13 kg/m      Constitutional: No apparent distress    HEENT: pupils equal round reactive to light, thyroid normal size, JVP is normal    Chest: Clear to percussion and auscultation    Cardiac: Normal S1, normal S2 no S3, no murmur or click    Abdomen: Scaphoid.  Liver percusses to 6 cm spleen is not palpable aorta is not tender or enlarged    Extremitiies: no edema.    Neurological:  Cranial nerves II through XII are intact, strength equal and symmetrical, displays normal insight and judgment        ASSESSMENT: Nathan Martins is a 79 year old male with          We reviewed the benefits of a regular exercise program, a Mediterranean-style weight loss diet, and contacting us for any changes in between now and the time of her next visit.     RECOMMENDATIONS:   1) No further cardiac testing  2) Would reduce or stop  lisinopril if dizziness with low blood pressure  3) follow up with me  in 3/2025 as scheduled        Recent Lab Results:  LIPID RESULTS:  Lab Results   Component Value " "Date    CHOL 130 03/01/2024    CHOL 133 01/21/2019    HDL 42 03/01/2024    HDL 55 01/21/2019    LDL 80 03/01/2024    LDL 68.8 01/21/2019    TRIG 42 03/01/2024    TRIG 46 01/21/2019    CHOLHDLRATIO 2.2 10/29/2015       LIVER ENZYME RESULTS:  Lab Results   Component Value Date    AST 22.0 01/21/2019    ALT 47 03/01/2024    ALT 34.0 01/21/2019       CBC RESULTS:  Lab Results   Component Value Date    WBC 10.5 03/01/2024    WBC 9.0 01/21/2019    RBC 3.29 (L) 03/01/2024    RBC 4.59 01/21/2019    HGB 8.3 (L) 03/01/2024    HGB 14.6 01/21/2019    HCT 26.4 (L) 03/01/2024    HCT 44.0 01/21/2019    MCV 80 03/01/2024    MCV 95.9 01/21/2019    MCH 25.2 (L) 03/01/2024    MCH 31.8 01/21/2019    MCHC 31.4 (L) 03/01/2024    MCHC 33.2 01/21/2019    RDW 15.8 (H) 03/01/2024    RDW 13.6 01/21/2019     (H) 03/01/2024     01/21/2019     12/05/2012       BMP RESULTS:  Lab Results   Component Value Date     03/01/2024     01/21/2019    POTASSIUM 3.9 03/01/2024    POTASSIUM 4.3 10/07/2021    POTASSIUM 4.4 01/21/2019    CHLORIDE 103 03/01/2024    CHLORIDE 105 10/07/2021    CHLORIDE 107 01/21/2019    CO2 24 03/01/2024    CO2 26 10/07/2021    CO2 25 12/05/2012    ANIONGAP 11 03/01/2024    ANIONGAP 7 10/07/2021    ANIONGAP 10 12/05/2012     (H) 03/01/2024     (H) 10/07/2021     (A) 01/21/2019    BUN 18.0 03/01/2024    BUN 20 10/07/2021    BUN 16 01/21/2019    CR 0.77 03/01/2024    CR 0.84 01/21/2019    GFRESTIMATED >90 03/01/2024    GFRESTIMATED >90 12/05/2012    GFRESTBLACK >90 12/05/2012    TORY 8.8 03/01/2024    TORY 8.7 01/21/2019        A1C RESULTS:  No results found for: \"A1C\"    INR RESULTS:  Lab Results   Component Value Date    INR 1.03 12/05/2012       We greatly appreciate the opportunity to be involved in the care of your patient, Nathan MENARD Eliezer.    Sincerely,  Raman Sabillon MD      CC  No referring provider defined for this encounter.                                    "

## 2024-09-14 ENCOUNTER — HEALTH MAINTENANCE LETTER (OUTPATIENT)
Age: 79
End: 2024-09-14

## 2024-09-18 ENCOUNTER — HOSPITAL ENCOUNTER (EMERGENCY)
Facility: CLINIC | Age: 79
Discharge: HOME | End: 2024-09-18
Attending: EMERGENCY MEDICINE | Admitting: EMERGENCY MEDICINE
Payer: COMMERCIAL

## 2024-09-18 VITALS
TEMPERATURE: 97.5 F | OXYGEN SATURATION: 92 % | RESPIRATION RATE: 16 BRPM | DIASTOLIC BLOOD PRESSURE: 74 MMHG | HEART RATE: 105 BPM | SYSTOLIC BLOOD PRESSURE: 108 MMHG

## 2024-09-18 DIAGNOSIS — D69.6 THROMBOCYTOPENIA (H): ICD-10-CM

## 2024-09-18 DIAGNOSIS — D72.819 LEUKOPENIA, UNSPECIFIED TYPE: ICD-10-CM

## 2024-09-18 DIAGNOSIS — R04.0 EPISTAXIS: ICD-10-CM

## 2024-09-18 LAB
BASOPHILS # BLD AUTO: 0 10E3/UL (ref 0–0.2)
BASOPHILS NFR BLD AUTO: 0 %
BURR CELLS BLD QL SMEAR: SLIGHT
ELLIPTOCYTES BLD QL SMEAR: SLIGHT
EOSINOPHIL # BLD AUTO: 0 10E3/UL (ref 0–0.7)
EOSINOPHIL NFR BLD AUTO: 3 %
ERYTHROCYTE [DISTWIDTH] IN BLOOD BY AUTOMATED COUNT: 23.6 % (ref 10–15)
HCT VFR BLD AUTO: 30.4 % (ref 40–53)
HGB BLD-MCNC: 9.8 G/DL (ref 13.3–17.7)
HOLD SPECIMEN: NORMAL
IMM GRANULOCYTES # BLD: 0 10E3/UL
IMM GRANULOCYTES NFR BLD: 0 %
LYMPHOCYTES # BLD AUTO: 0.1 10E3/UL (ref 0.8–5.3)
LYMPHOCYTES NFR BLD AUTO: 13 %
MCH RBC QN AUTO: 26 PG (ref 26.5–33)
MCHC RBC AUTO-ENTMCNC: 32.2 G/DL (ref 31.5–36.5)
MCV RBC AUTO: 81 FL (ref 78–100)
MONOCYTES # BLD AUTO: 0.1 10E3/UL (ref 0–1.3)
MONOCYTES NFR BLD AUTO: 17 %
NEUTROPHILS # BLD AUTO: 0.5 10E3/UL (ref 1.6–8.3)
NEUTROPHILS NFR BLD AUTO: 68 %
NRBC # BLD AUTO: 0 10E3/UL
NRBC BLD AUTO-RTO: 0 /100
PLAT MORPH BLD: ABNORMAL
PLATELET # BLD AUTO: 25 10E3/UL (ref 150–450)
RBC # BLD AUTO: 3.77 10E6/UL (ref 4.4–5.9)
RBC MORPH BLD: ABNORMAL
WBC # BLD AUTO: 0.7 10E3/UL (ref 4–11)

## 2024-09-18 PROCEDURE — 85004 AUTOMATED DIFF WBC COUNT: CPT

## 2024-09-18 PROCEDURE — 30901 CONTROL OF NOSEBLEED: CPT | Mod: LT

## 2024-09-18 PROCEDURE — 250N000009 HC RX 250: Performed by: EMERGENCY MEDICINE

## 2024-09-18 PROCEDURE — 36415 COLL VENOUS BLD VENIPUNCTURE: CPT

## 2024-09-18 PROCEDURE — 99284 EMERGENCY DEPT VISIT MOD MDM: CPT | Mod: 25

## 2024-09-18 RX ORDER — LIDOCAINE HYDROCHLORIDE 40 MG/ML
5 INJECTION, SOLUTION RETROBULBAR ONCE
Status: COMPLETED | OUTPATIENT
Start: 2024-09-18 | End: 2024-09-18

## 2024-09-18 RX ORDER — TRANEXAMIC ACID 100 MG/ML
INJECTION, SOLUTION INTRAVENOUS ONCE
Status: COMPLETED | OUTPATIENT
Start: 2024-09-18 | End: 2024-09-18

## 2024-09-18 RX ORDER — OXYMETAZOLINE HYDROCHLORIDE 0.05 G/100ML
2 SPRAY NASAL ONCE
Status: COMPLETED | OUTPATIENT
Start: 2024-09-18 | End: 2024-09-18

## 2024-09-18 RX ADMIN — LIDOCAINE HYDROCHLORIDE 5 ML: 40 INJECTION, SOLUTION RETROBULBAR; TOPICAL at 07:12

## 2024-09-18 RX ADMIN — TRANEXAMIC ACID: 1 INJECTION, SOLUTION INTRAVENOUS at 07:11

## 2024-09-18 RX ADMIN — OXYMETAZOLINE HYDROCHLORIDE 2 SPRAY: 0.05 SPRAY NASAL at 07:10

## 2024-09-18 ASSESSMENT — ACTIVITIES OF DAILY LIVING (ADL)
ADLS_ACUITY_SCORE: 37

## 2024-09-18 NOTE — ED TRIAGE NOTES
Reports nosebleed for the past 4 hours. Usually can get it stopped but bleeding continues despite use of clamp.  Recently completed radiation and chemo for lung cancer. Taking asa 81 mg daily and toradol.

## 2024-09-18 NOTE — ED PROVIDER NOTES
Emergency Department Attending Supervision Note  2/21/2018  4:46 PM      I evaluated this patient in conjunction with Jackie MARKHAM      Briefly, the patient presented with atraumatic epistaxis.  On aspirin no other anticoagulants.  History of lung cancer recently completing chemo and radiation        On my exam,     L Nare shows superficial bleeding superior aspect of the mid septum    Oropharynx unremarkable    No respiratory distress            Results:        My impression is     Left nare epistaxis, hemostasis achieved in the ED with medications, pressure, time and hemostatic enough to undergo silver nitrate cautery.  Discharged home.  CBC resulted after discharge showing thrombocytopenia and leukopenia.  See Oscar's note for details but he called the patient and they were able to get in touch with the oncologist who can see him tomorrow morning repeat platelet count and transfuse products if needed.  They will return with recurrent uncontrollable epistaxis or any other concerns.        Diagnosis    ICD-10-CM    1. Epistaxis  R04.0               Juarez Mohamud MD Walker, Jerome Richard, MD  09/18/24 1411

## 2024-09-18 NOTE — ED PROVIDER NOTES
Emergency Department Note      History of Present Illness     Chief Complaint   Epistaxis      HPI   Nathan Martins is a 79 year old male with history of hypertension, CAD, cardiomyopathy, and lung cancer having just completed radiation and chemotherapy last week who presents with complaint of a nosebleed.  Patient states left side of his nose started bleeding approximately 4 to 5 hours ago.  Patient states he was laying in bed attempting to sleep when his nose started bleeding.  He denies traumatic injury to the nose, sneezing, rubbing of his nose or any other thoughts as to whether no start bleeding.  He also denies headache, facial pain, fever or chills, shortness of breath.    Independent Historian   None    Review of External Notes   9/15/2024 urgent care note reviewed for back pain, patient prescribed ketorolac.    Past Medical History     Medical History and Problem List   Past Medical History:   Diagnosis Date    Arthritis     Cardiomyopathy (H)     Coronary artery disease     Dyslipidemia     Hypertension     Stented coronary artery 4/3/12       Medications   aspirin 81 MG tablet  atorvastatin (LIPITOR) 80 MG tablet  Bioflavonoid Products (VITAMIN C) CHEW  Biotin 5000 MCG TABS  carvedilol (COREG) 12.5 MG tablet  cholecalciferol (VITAMIN  -D) 1000 UNITS capsule  Fe-Succ Ac-B [place]hble-K-Km-FA (IROSPAN 24/6) MISC  hydrochlorothiazide (HYDRODIURIL) 25 MG tablet  lisinopril (ZESTRIL) 30 MG tablet  Multiple Vitamins-Minerals (CENTRUM SILVER 50+MEN PO)  nitroGLYcerin (NITROSTAT) 0.4 MG sublingual tablet        Surgical History   Past Surgical History:   Procedure Laterality Date    ANGIOPLASTY  4/3/12     04/12 Proximal RCA 20% ( collateral filling of OM), CFX occluded with collaterals (collateral filling of distal OM), Proximal LAD, before first septal + 1st diag., has 85% stenosis,  Balloon angioplasty and BMS to proximal LAD, 12/2012 Cath - minimal in stent restenosis    CATARACT IOL, RT/LT       ORTHOPEDIC SURGERY      right elbow    OSTEOTOMY WRIST  5/22/2012    Procedure:OSTEOTOMY WRIST; Left Distal Radius Corrective Osteotomy         Physical Exam     No data found.    Physical Exam  Physical Exam:  GENERAL: Warm, dry, alert, no increased work of breathing  HEENT: PERRLA, clear conjunctiva.  Bright red blood dripping from left nares, blood appreciated in posterior oropharynx.  NECK: No JVD, supple without lymphadenopathy.  No stiffness or restricted range of motion  HEART: Regular rate and rhythm, no murmur or rubs  LUNGS: CTAB, moving air well.  No crackles or wheezes are heard.  ABD: Soft, nontender, nondistended, no guarding, with good bowel sounds heard.  BACK: No CVAT, no obvious deformities  EXTREMITIES: Moves all extremities without difficulty, no calf tenderness or peripheral edema.  SKIN: Warm and dry without rash or lesions.  NEUROLOGICAL: No focal deficits.    PSYCH: Appropriate mood and affect.     Diagnostics     Lab Results   Labs Ordered and Resulted from Time of ED Arrival to Time of ED Departure - No data to display    Imaging   No orders to display       Independent Interpretation   None    ED Course      Medications Administered   Medications   oxymetazoline (AFRIN) 0.05 % spray 2 spray (2 sprays Nasal $Given 9/18/24 0710)   lidocaine 4 % injection 5 mL (5 mLs Intranasal $Given 9/18/24 0712)   tranexamic acid (CYKLOKAPRON) TOPICAL (injection used topically) ( Topical $Given 9/18/24 0711)       Procedures   Procedures     Discussion of Management   Staffed with Dr. Mohamud    ED Course   ED Course as of 09/19/24 1005   Wed Sep 18, 2024   0620 I evaluated and examined the patient   0640 I instilled oxymetazoline and a pledget soaked in oxymetazoline into the left nares, nasal clamp applied   0700 Nasal pledget removed, left nares still oozing blood.  Pledgets soaked in TXA applied to left nares.   0730 TXA soaked nasal pledget removed, patient continued to have oozing from left nare.    0732 Upon direct visualization, bleeding was appreciated from small vessels in the left naris anterior septum.  Silver nitrate stick was applied to bleeding vessels.  Bleeding stopped for 15 minutes then resumed, silver nitrate once again applied bleeding was controlled.   0922 Patient CBC resulted after they left, platelets noted at 25,000.  I called the patient's house, spoke to his wife Stephanie.  I let her know the importance of coming back to the emergency department for a transfusion of platelets.  She stated she would prefer to call his oncologist to get their recommendation.  I did let her know he is at risk for significant bleeding including life-threatening bleeding.  She states she would still prefer to call his oncologist.   1008 I spoke with patient's wife, Stephanie, she had spoken with Pat's oncologist who recommended a recheck of the platelets tomorrow, thrombocytopenia was expected given chemotherapy 9 days ago.   1051 I spoke with Pat's wife again, she had another conversation with Pat's oncologist.  Cammy has an appointment at 730 tomorrow morning to repeat labs and to receive transfusion of platelets.  She instructed to return to the emergency department if he has resumption of bleeding otherwise.       Additional Documentation  None    Medical Decision Making / Diagnosis     CMS Diagnoses: None    MIPS       None    MDM   Nathan Martins is a 79 year old male with history of hypertension, CAD, cardiomyopathy, and lung cancer having just completed radiation and chemotherapy last week who presents with complaint of epistaxis.  Differential includes but is not limited to anterior versus posterior epistaxis and thrombocytopenia among others.  Patient mildly hypertensive, SpO2 91% however patient currently under treatment for lung cancer, review of previous visits demonstrates SpO2 91%.  Patient is on oxygen at home as needed, has not used for 2 days.  Oxygen condenser at home does not use humidified O2,  this may have contributed to nosebleed.  Bleeding appears to be isolated to the left naris.  Efforts to control bleeding included oxymetazoline, TXA, and eventually cautery with silver nitrate.  Bleeding was controlled with cautery.  Unfortunately, patient was discharged prior to results of CBC, his platelets were 25,000 and WBC 0.7.  Thrombocytopenia and leukopenia are most likely secondary to chemotherapy 9 days ago.  I called the patient and spoke to his wife, I let her know he was at risk for significant bleeding and infection.  She elected to call Cammy's oncologist rather than return to the emergency department.  I once again called the patient and spoke with his wife Stephanie, the oncologist expected this thrombocytopenia and would like the patient to go to clinic tomorrow for repeat labs, he has an appointment for 7:30 AM.  Cammy was also instructed to return to the emergency department if bleeding resumes.  I thoroughly discussed this case with SP Dr. Mohamud, he was aware of laboratory results and all phone conversations.    Disposition   The patient was discharged.     Diagnosis     ICD-10-CM    1. Epistaxis  R04.0       2. Thrombocytopenia (H24)  D69.6       3. Leukopenia, unspecified type  D72.819            Discharge Medications   Discharge Medication List as of 9/18/2024  8:51 AM            RASHI Feliciano John, PA-C  09/19/24 1000

## 2024-09-18 NOTE — ED NOTES
DATE/TIME OF CALL RECEIVED FROM LAB:  09/18/24 at 9:12 AM   LAB TEST:  WBC & Platelets  LAB VALUE:  WBC 0.7, platelets 25  PROVIDER NOTIFIED?: Yes  PROVIDER NAME: walker  DATE/TIME LAB VALUE REPORTED TO PROVIDER: 09/18/24 @ 0913  MECHANISM OF PROVIDER NOTIFICATION: Face-To-Face  PROVIDER RESPONSE:

## 2024-10-07 ENCOUNTER — OFFICE VISIT (OUTPATIENT)
Dept: CARDIOLOGY | Facility: CLINIC | Age: 79
End: 2024-10-07
Payer: COMMERCIAL

## 2024-10-07 VITALS
DIASTOLIC BLOOD PRESSURE: 61 MMHG | WEIGHT: 140 LBS | HEIGHT: 69 IN | OXYGEN SATURATION: 99 % | SYSTOLIC BLOOD PRESSURE: 98 MMHG | BODY MASS INDEX: 20.73 KG/M2 | HEART RATE: 102 BPM

## 2024-10-07 DIAGNOSIS — I25.10 CORONARY ARTERY DISEASE INVOLVING NATIVE CORONARY ARTERY OF NATIVE HEART WITHOUT ANGINA PECTORIS: Primary | ICD-10-CM

## 2024-10-07 DIAGNOSIS — I42.9 CARDIOMYOPATHY, UNSPECIFIED TYPE (H): ICD-10-CM

## 2024-10-07 PROCEDURE — 99215 OFFICE O/P EST HI 40 MIN: CPT | Performed by: PHYSICIAN ASSISTANT

## 2024-10-07 PROCEDURE — G2211 COMPLEX E/M VISIT ADD ON: HCPCS | Performed by: PHYSICIAN ASSISTANT

## 2024-10-07 RX ORDER — METOPROLOL SUCCINATE 50 MG/1
50 TABLET, EXTENDED RELEASE ORAL 2 TIMES DAILY
COMMUNITY
Start: 2024-08-23

## 2024-10-07 RX ORDER — MORPHINE SULFATE 15 MG/1
15 TABLET, FILM COATED, EXTENDED RELEASE ORAL DAILY
COMMUNITY
Start: 2024-09-23

## 2024-10-07 RX ORDER — FUROSEMIDE 20 MG/1
20 TABLET ORAL DAILY
COMMUNITY
Start: 2024-08-16 | End: 2024-10-07 | Stop reason: DRUGHIGH

## 2024-10-07 RX ORDER — FUROSEMIDE 20 MG/1
TABLET ORAL
Qty: 35 TABLET | Refills: 5 | Status: SHIPPED | OUTPATIENT
Start: 2024-10-07 | End: 2024-10-07

## 2024-10-07 RX ORDER — FUROSEMIDE 20 MG/1
TABLET ORAL
Qty: 35 TABLET | Refills: 5 | Status: SHIPPED | OUTPATIENT
Start: 2024-10-07

## 2024-10-07 NOTE — Clinical Note
Coming to you for CORE enrollment in [facility]. I did a lot of education today, but they were overwhelmed. Wife very involved. He didn't have any cardiac imaging between 2012 and this year, EF was down. Then after anapylaxis in July EF was down more (maybe stress then). He was hypervolemic today, I increased his lasix but didn't want to add ARB at the same time and confuse them or anger his kidneys.   Thanks for seeing him, he's happy to have an appt closer to home!

## 2024-10-07 NOTE — LETTER
10/7/2024    QUE SARMIENTO MD  [id number]  [facility] [address]    RE: Nathan Martins       Dear Colleague,     I had the pleasure of seeing Nathan Martins in the [facility].          HEART CARE FOLLOW UP    Primary Care: Pallas, Kenneth G, MD  Primary Cardiologist: Dr Sabillon      Assessment/Recommendations     Cardiomyopathy, ejection fraction in March 2024 measured at 45 to 50%.  Last imaging prior to this was in 2012. Subsequent imaging in [facility] system in August 2024 showed EF 35% with moderate generalized LV hypokinesis. Unclear if drop in EF to 35% was stress related due to anaphylaxis of the progression of alternative form of cardiomyopathy. Paclitaxel can have some cardiac effects, he also has a hx of CAD and if EF does not improve, consideration can be given to ischemic workup if this aligns with his goals of care. Given anaphylaxis, which was likely related to paclitaxel, ACE now contraindicated. He is hypervolemic with significant peripheral edema. Dyspnea and hypoxia is unlikely related to heart failure. We reviewed his cardiac history at length today and his cardiomyopathy, this was his first introduction to his heart failure.   TSH needs to be drawn at next lab visit  Continue Toprol XL 50 mg daily   Increase furosemide from 20 mg daily to 40 mg daily x 3 days then update us  Daily weights   Consider addition of jardiance 10 mg daily at next visit  As ACE contraindicated, consider losartan addition at next visit. Entresto would be ideal, but given low BP, this is unlikely to be tolerated   Referral to [facility] clinic in [place] for enrollment, this is closer to home for him  Reviewed low sodium diet, due to taste changes this may be difficult-patient handouts provided today   Will work towards maximum tolerated doses of GDMT and repeat TTE or cardiac MRI. If EF has not normalized consideration can be given to ischemic workup if this aligns with his goals of care. TSH  needs to be checked.   Coronary Artery Disease, he underwent bare-metal stent placement in 2012 doing well without symptoms concerning for angina or heart failure. Secondary prevention is of the utmost importance. Many  medications were discontinued when the patient had anaphylaxis and they are not currently on aspirin. Seems most likely anaphylaxis was related to paclitaxel new start.   resume aspirin 81 mg daily  Continue atorvastatin 80 mg daily   Continue to be as active as able  Patient has nitroglycerin and undersatnds appropraite use and when to seek emergent care  Alcohol only in moderation   Aortic stenosis, moderate, calcific-aortic valve area 1.1 cm , dimensionless index 0.27, mean systolic gradient 19 mmHg.  Will need yearly TTE    Return to care in  2-3 weeks  with [facility] [place] for [facility] enrollment.      History of Present Illness/Subjective    Nathan Martins is a 79 year old male with past medical history significant for:  Coronary artery disease  Status post BMS to the LAD om 2012, in 2015 this was noted to be patent  Aortic stenosis, moderate 2024  Mild cardiomyopathy  Hypertension  Dyslipidemia  Lung adenocarcinoma, Stage III, RUL mass with satellite lesions and enlarged lymph nodes  S/p cisplatin and etoposide  9/12/2024 completed chemoradiation  Plan for 1 year of consolidation with durvalumab  Anemia  MGUS  Thrombocytopenia     The patient was last seen in in clinic in May of this year by Dr. Sabiloln.  In March of this year the patient had noticed 20 pound weight loss and some mild exertional dyspnea which had worsened over the previous 2 months. There was concern regarding his anemia. He subsequently was found to have lung cancer and has since started chemo and radiation.  He now is hypoxic and requiring supplemental oxygen. There is plan for upcoming palliative care visit. Of note, in July 2024 he was admitted with anaphylaxis after starting paclitaxel.     Today the patient tells me he is  unsure how the chemo or radiation is going, he's frustrated he doesn't feel like he gets clear updates. He's frustrated he's on oxygen and prior to his chemo he could golf 18 holes in hot weather. He is now quite fatigued. He can walk around the house waering his oxygen without issue. Walking in/out of appointments is tolerated so long as he's wearing his oxygen. He's doing some exercises. No chest pain or pressure. With wearing oxygen, he doesn't get short of breath. No dizziness, lightheadedness, palpitations or racing heart. Sleeps on 2 pillows without orthopnea or PND.  Has some swelling in his feel or ankles since the drug reaction in July 2024. No blood in stool or urine. No fever, chills or nightsweats.        Data Review Today:     TTE March 2024:  The visual ejection fraction is 45-50%.  Inferolateral wall and inferoseptum appear hypokinetic.  Mild to moderate valvular aortic stenosis.  The calculated aortic valve area is 1.3cm2.  The mean AoV pressure gradient is 19mmHg.  Contrast was used without apparent complications.    TTE August 2024:  Final Conclusion    1. Normal left ventricular chamber size. Normal left ventricular wall thickness.  Moderately   decreased left ventricular systolic function.    Calculated left ventricular ejection fraction (modified Beckford technique) is 35%. Moderate   generalized left ventricular hypokinesis.    There is significant rxnn-lv-kiah variability limiting EF assessment and assessment of aortic   valve stenosis.    2. Normal right ventricular chamber size. Normal right ventricular systolic function.    3.  Mild-moderate calcific aortic valve stenosis. Aortic valve systolic mean gradient is 26   mmHg. Aortic valve systolic peak velocity is    3.3 m/sec. Aortic valve area by Doppler is 1.7 cm . Trivial aortic valve regurgitation.    4. Moderate biatrial enlargement.    5. Possible patent foramen ovale with left-to-right shunt on color Doppler.     Coronary Angiogram  2012:  PROCEDURES PERFORMED:   1.  Left heart catheterization and left ventriculogram.   2.  Selective coronary artery angiography.   3.  Balloon angioplasty and stenting of a proximal LAD stenosis using   a 2.75 balloon, a 3.0 x 18 mm bare-metal Integrity stent with post   stent dilatation using a 3.5 mm balloon and a 4.0 balloon.       I have reviewed and updated the patient's past medical history, allergy list and medication list.          Physical Examination   Vitals: There were no vitals taken for this visit.    BMI= There is no height or weight on file to calculate BMI.    Wt Readings from Last 3 Encounters:   05/20/24 71 kg (156 lb 9.6 oz)   03/04/24 73.3 kg (161 lb 8 oz)   12/09/22 80.4 kg (177 lb 3.2 oz)       General :   Alert and oriented, in no acute distress.    HEENT:  Normocephalic and atraumatic. .    Neck: No JVP, carotid bruit or obvious thyromegaly.   Lungs:   Respirations unlabored. Clear bilaterally with no rales, rhonchi, or wheezes.     Cardiovascular:   Rhythm is regular. S1 and S2 are normal. No significant murmur is present. Lower extremities demonstrate 2+ edema to high shin       Skin: Skin is warm, dry, and otherwise intact.   Neurologic: Gait not assessed. Mood and affect appropriate.           Medical History  Surgical History Family History Social History   Past Medical History:   Diagnosis Date     Arthritis     generalized     Cardiomyopathy (H)     4/2012 EF 30-35% by Cath, 12/2012 EF 50% by cath, 11/2012 EF 42% by Echo     Coronary artery disease     04/12 Proximal RCA 20% ( collateral filling of OM), CFX occluded with collaterals (collateral filling of distal OM), Proximal LAD, before first septal + 1st diag., has 85% stenosis,  Balloon angioplasty and BMS to proximal LAD, 12/2012 Cath - minimal in stent restenosis     Dyslipidemia      Hypertension      Stented coronary artery 4/3/12    04/12 Proximal RCA 20% ( collateral filling of OM), CFX occluded with collaterals,   (collateral filling of distal OM),Proximal LAD, before first septal + 1st diag., has 85% stenosis,  Balloon angioplasty and BMS to proximal LAD    Past Surgical History:   Procedure Laterality Date     ANGIOPLASTY  4/3/12     04/12 Proximal RCA 20% ( collateral filling of OM), CFX occluded with collaterals (collateral filling of distal OM), Proximal LAD, before first septal + 1st diag., has 85% stenosis,  Balloon angioplasty and BMS to proximal LAD, 2012 Cath - minimal in stent restenosis     CATARACT IOL, RT/LT       ORTHOPEDIC SURGERY      right elbow     OSTEOTOMY WRIST  2012    Procedure:OSTEOTOMY WRIST; Left Distal Radius Corrective Osteotomy      Family History   Problem Relation Age of Onset     Other - See Comments Mother 96        old age     Cancer Father 47        pancreatic     Diabetes Sister     Social History     Socioeconomic History     Marital status:      Spouse name: Not on file     Number of children: Not on file     Years of education: Not on file     Highest education level: Not on file   Occupational History     Not on file   Tobacco Use     Smoking status: Former     Current packs/day: 0.00     Types: Cigarettes     Quit date: 2001     Years since quittin.7     Smokeless tobacco: Never   Substance and Sexual Activity     Alcohol use: Yes     Alcohol/week: 0.0 standard drinks of alcohol     Comment: occas     Drug use: No     Sexual activity: Not on file   Other Topics Concern     Parent/sibling w/ CABG, MI or angioplasty before 65F 55M? Not Asked      Service Not Asked     Blood Transfusions Not Asked     Caffeine Concern No     Comment: 1-2 cups per day     Occupational Exposure Not Asked     Hobby Hazards Not Asked     Sleep Concern No     Stress Concern No     Weight Concern No     Special Diet No     Back Care Not Asked     Exercise Yes     Comment: walking dog 2 times daily     Bike Helmet Not Asked     Seat Belt Yes     Self-Exams  Not Asked   Social History Narrative     Not on file     Social Determinants of Health     Financial Resource Strain: Low Risk  (7/25/2023)    Received from [facility], [facility] [place]    Financial Resource Strain      Difficulty of Paying Living Expenses: 3      Difficulty of Paying Living Expenses: Not on file   Food Insecurity: No Food Insecurity (7/25/2023)    Received from [facility], [facility] [place]    Food Insecurity      Worried About Running Out of Food in the Last Year: 1   Transportation Needs: No Transportation Needs (7/25/2023)    Received from [facility] [place], [facility] [place]    Transportation Needs      Lack of Transportation (Medical): 1   Physical Activity: Not on file   Stress: Not on file   Social Connections: Socially Integrated (7/25/2023)    Received from [facility], [facility] [place]    Social Connections      Frequency of Communication with Friends and Family: 0   Interpersonal Safety: Not on file   Housing Stability: Low Risk  (7/25/2023)    Received from [facility], [facility] [place]    Housing Stability      Unable to Pay for Housing in the Last Year: 1          Medications  Allergies   Scheduled Meds:  Current Outpatient Medications   Medication Sig Dispense Refill     aspirin 81 MG tablet Take by mouth daily       atorvastatin (LIPITOR) 80 MG tablet Take 1 tablet (80 mg) by mouth daily 90 tablet 11     Bioflavonoid Products (VITAMIN C) CHEW Take 300 mg by mouth daily       Biotin 5000 MCG TABS Take by mouth daily       carvedilol (COREG) 12.5 MG tablet Take 1 tablet (12.5 mg) by mouth 2 times daily (with meals) 180 tablet 3     cholecalciferol (VITAMIN  -D) 1000 UNITS capsule Take 1  capsule by mouth daily       Fe-Succ Ac-B Dniml-K-Rn-FA (IROSPAN 24/6) MISC Take by mouth. 65mg 3 times a day       hydrochlorothiazide (HYDRODIURIL) 25 MG tablet Take 1 tablet (25 mg) by mouth daily (Patient not taking: Reported on 5/20/2024) 90 tablet 3     lisinopril (ZESTRIL) 30 MG tablet Take 1 tablet (30 mg) by mouth daily 90 tablet 3     Multiple Vitamins-Minerals (CENTRUM SILVER 50+MEN PO) Take by mouth daily       nitroGLYcerin (NITROSTAT) 0.4 MG sublingual tablet Place 1 tablet (0.4 mg) under the tongue every 5 minutes as needed for chest pain 25 tablet 3    No Known Allergies      Lab Results    Chemistry/lipid CBC Cardiac Enzymes/BNP/TSH/INR   Lab Results   Component Value Date    CHOL 130 03/01/2024    HDL 42 03/01/2024    TRIG 42 03/01/2024    BUN 18.0 03/01/2024     03/01/2024    CO2 24 03/01/2024    Lab Results   Component Value Date    WBC 0.7 (LL) 09/18/2024    HGB 9.8 (L) 09/18/2024    HCT 30.4 (L) 09/18/2024    MCV 81 09/18/2024    PLT 25 (LL) 09/18/2024    @RESUFAST(BMP,CBC,BNP,TSH,  INR)@      50 minutes spent reviewing prior records (including documentation, laboratory studies, cardiac testing/imaging), history and physical exam, planning, and subsequent documentation.     The longitudinal plan of care for the condition(s) below were addressed during this visit. Due to the added complexity in care, I will continue to support Pat in the subsequent management of this condition(s) and with the ongoing continuity of care of this condition(s):  coronary artery disease and congestive heart failure     This note has been dictated using voice recognition software. Any grammatical, typographical, or context distortions are unintentional and inherent to the software.    Suri Ambrocio PA-C, RD  General Cardiology           Thank you for allowing me to participate in the care of your patient.      Sincerely,     Suri Ambrocio PA-C     [facility]  cc:   Raman Sabillon MD  [address]  [place] [id number]

## 2024-10-07 NOTE — PATIENT INSTRUCTIONS
It was great to see you today! Your Cardiology Team includes myself and Dr. Sabillon.     Recommendations from today:  Change how you take furosemide: Take 2 tablets (40 mg) by mouth daily for 3 days then resume 1 tablet (20 mg) by mouth daily.  Continue to wear compression stockings  Check weight daily  Call us on Thursday or Friday and let us know how his swelling and weight is doing  Keep fluid intake the same  Check for low sodium options   Follow up in 2-3 weeks with our [facility] team.     For scheduling questions, our 24 hours scheduling line is available at [phone].    To reach our nursing team, please call [phone]. Our nursing team is available 8-4:30 Monday-Friday. If you have a medical emergency, please call [phone].

## 2024-10-07 NOTE — PROGRESS NOTES
HEART CARE FOLLOW UP    Primary Care: Pallas, Kenneth G, MD  Primary Cardiologist: Dr Sabillon      Assessment/Recommendations     Cardiomyopathy, ejection fraction in March 2024 measured at 45 to 50%.  Last imaging prior to this was in 2012. Subsequent imaging in [facility] system in August 2024 showed EF 35% with moderate generalized LV hypokinesis. Unclear if drop in EF to 35% was stress related due to anaphylaxis of the progression of alternative form of cardiomyopathy. Paclitaxel can have some cardiac effects, he also has a hx of CAD and if EF does not improve, consideration can be given to ischemic workup if this aligns with his goals of care. Given anaphylaxis, which was likely related to paclitaxel, ACE now contraindicated. He is hypervolemic with significant peripheral edema. Dyspnea and hypoxia is unlikely related to heart failure. We reviewed his cardiac history at length today and his cardiomyopathy, this was his first introduction to his heart failure.   TSH needs to be drawn at next lab visit  Continue Toprol XL 50 mg daily   Increase furosemide from 20 mg daily to 40 mg daily x 3 days then update us  Daily weights   Consider addition of jardiance 10 mg daily at next visit  As ACE contraindicated, consider losartan addition at next visit. Entresto would be ideal, but given low BP, this is unlikely to be tolerated   Referral to [facility] clinic in [place] for enrollment, this is closer to home for him  Reviewed low sodium diet, due to taste changes this may be difficult-patient handouts provided today   Will work towards maximum tolerated doses of GDMT and repeat TTE or cardiac MRI. If EF has not normalized consideration can be given to ischemic workup if this aligns with his goals of care. TSH needs to be checked.   Coronary Artery Disease, he underwent bare-metal stent placement in 2012 doing well without symptoms concerning for angina or heart failure. Secondary prevention is of the utmost  importance. Many  medications were discontinued when the patient had anaphylaxis and they are not currently on aspirin. Seems most likely anaphylaxis was related to paclitaxel new start.   resume aspirin 81 mg daily  Continue atorvastatin 80 mg daily   Continue to be as active as able  Patient has nitroglycerin and undersatnds appropraite use and when to seek emergent care  Alcohol only in moderation   Aortic stenosis, moderate, calcific-aortic valve area 1.1 cm , dimensionless index 0.27, mean systolic gradient 19 mmHg.  Will need yearly TTE    Return to care in  2-3 weeks  with [facility] for CORE enrollment.      History of Present Illness/Subjective    Nathan Martins is a 79 year old male with past medical history significant for:  Coronary artery disease  Status post BMS to the LAD om 2012, in 2015 this was noted to be patent  Aortic stenosis, moderate 2024  Mild cardiomyopathy  Hypertension  Dyslipidemia  Lung adenocarcinoma, Stage III, RUL mass with satellite lesions and enlarged lymph nodes  S/p cisplatin and etoposide  9/12/2024 completed chemoradiation  Plan for 1 year of consolidation with durvalumab  Anemia  MGUS  Thrombocytopenia     The patient was last seen in in clinic in May of this year by Dr. Sabillon.  In March of this year the patient had noticed 20 pound weight loss and some mild exertional dyspnea which had worsened over the previous 2 months. There was concern regarding his anemia. He subsequently was found to have lung cancer and has since started chemo and radiation.  He now is hypoxic and requiring supplemental oxygen. There is plan for upcoming palliative care visit. Of note, in July 2024 he was admitted with anaphylaxis after starting paclitaxel.     Today the patient tells me he is unsure how the chemo or radiation is going, he's frustrated he doesn't feel like he gets clear updates. He's frustrated he's on oxygen and prior to his chemo he could golf 18 holes in hot weather. He is  now quite fatigued. He can walk around the house waering his oxygen without issue. Walking in/out of appointments is tolerated so long as he's wearing his oxygen. He's doing some exercises. No chest pain or pressure. With wearing oxygen, he doesn't get short of breath. No dizziness, lightheadedness, palpitations or racing heart. Sleeps on 2 pillows without orthopnea or PND.  Has some swelling in his feel or ankles since the drug reaction in July 2024. No blood in stool or urine. No fever, chills or nightsweats.        Data Review Today:     TTE March 2024:  The visual ejection fraction is 45-50%.  Inferolateral wall and inferoseptum appear hypokinetic.  Mild to moderate valvular aortic stenosis.  The calculated aortic valve area is 1.3cm2.  The mean AoV pressure gradient is 19mmHg.  Contrast was used without apparent complications.    TTE August 2024:  Final Conclusion    1. Normal left ventricular chamber size. Normal left ventricular wall thickness.  Moderately   decreased left ventricular systolic function.    Calculated left ventricular ejection fraction (modified Beckford technique) is 35%. Moderate   generalized left ventricular hypokinesis.    There is significant ykpp-va-wvva variability limiting EF assessment and assessment of aortic   valve stenosis.    2. Normal right ventricular chamber size. Normal right ventricular systolic function.    3.  Mild-moderate calcific aortic valve stenosis. Aortic valve systolic mean gradient is 26   mmHg. Aortic valve systolic peak velocity is    3.3 m/sec. Aortic valve area by Doppler is 1.7 cm . Trivial aortic valve regurgitation.    4. Moderate biatrial enlargement.    5. Possible patent foramen ovale with left-to-right shunt on color Doppler.     Coronary Angiogram 2012:  PROCEDURES PERFORMED:   1.  Left heart catheterization and left ventriculogram.   2.  Selective coronary artery angiography.   3.  Balloon angioplasty and stenting of a proximal LAD stenosis using   a  2.75 balloon, a 3.0 x 18 mm bare-metal Integrity stent with post   stent dilatation using a 3.5 mm balloon and a 4.0 balloon.       I have reviewed and updated the patient's past medical history, allergy list and medication list.          Physical Examination   Vitals: There were no vitals taken for this visit.    BMI= There is no height or weight on file to calculate BMI.    Wt Readings from Last 3 Encounters:   05/20/24 71 kg (156 lb 9.6 oz)   03/04/24 73.3 kg (161 lb 8 oz)   12/09/22 80.4 kg (177 lb 3.2 oz)       General :   Alert and oriented, in no acute distress.    HEENT:  Normocephalic and atraumatic. .    Neck: No JVP, carotid bruit or obvious thyromegaly.   Lungs:   Respirations unlabored. Clear bilaterally with no rales, rhonchi, or wheezes.     Cardiovascular:   Rhythm is regular. S1 and S2 are normal. No significant murmur is present. Lower extremities demonstrate 2+ edema to high shin       Skin: Skin is warm, dry, and otherwise intact.   Neurologic: Gait not assessed. Mood and affect appropriate.           Medical History  Surgical History Family History Social History   Past Medical History:   Diagnosis Date    Arthritis     generalized    Cardiomyopathy (H)     4/2012 EF 30-35% by Cath, 12/2012 EF 50% by cath, 11/2012 EF 42% by Echo    Coronary artery disease     04/12 Proximal RCA 20% ( collateral filling of OM), CFX occluded with collaterals (collateral filling of distal OM), Proximal LAD, before first septal + 1st diag., has 85% stenosis,  Balloon angioplasty and BMS to proximal LAD, 12/2012 Cath - minimal in stent restenosis    Dyslipidemia     Hypertension     Stented coronary artery 4/3/12    04/12 Proximal RCA 20% ( collateral filling of OM), CFX occluded with collaterals,  (collateral filling of distal OM),Proximal LAD, before first septal + 1st diag., has 85% stenosis,  Balloon angioplasty and BMS to proximal LAD    Past Surgical History:   Procedure Laterality Date     ANGIOPLASTY  4/3/12     04/12 Proximal RCA 20% ( collateral filling of OM), CFX occluded with collaterals (collateral filling of distal OM), Proximal LAD, before first septal + 1st diag., has 85% stenosis,  Balloon angioplasty and BMS to proximal LAD, 2012 Cath - minimal in stent restenosis    CATARACT IOL, RT/LT      ORTHOPEDIC SURGERY      right elbow    OSTEOTOMY WRIST  2012    Procedure:OSTEOTOMY WRIST; Left Distal Radius Corrective Osteotomy      Family History   Problem Relation Age of Onset    Other - See Comments Mother 96        old age    Cancer Father 47        pancreatic    Diabetes Sister     Social History     Socioeconomic History    Marital status:      Spouse name: Not on file    Number of children: Not on file    Years of education: Not on file    Highest education level: Not on file   Occupational History    Not on file   Tobacco Use    Smoking status: Former     Current packs/day: 0.00     Types: Cigarettes     Quit date: 2001     Years since quittin.7    Smokeless tobacco: Never   Substance and Sexual Activity    Alcohol use: Yes     Alcohol/week: 0.0 standard drinks of alcohol     Comment: occas    Drug use: No    Sexual activity: Not on file   Other Topics Concern    Parent/sibling w/ CABG, MI or angioplasty before 65F 55M? Not Asked     Service Not Asked    Blood Transfusions Not Asked    Caffeine Concern No     Comment: 1-2 cups per day    Occupational Exposure Not Asked    Hobby Hazards Not Asked    Sleep Concern No    Stress Concern No    Weight Concern No    Special Diet No    Back Care Not Asked    Exercise Yes     Comment: walking dog 2 times daily    Bike Helmet Not Asked    Seat Belt Yes    Self-Exams Not Asked   Social History Narrative    Not on file     Social Determinants of Health     Financial Resource Strain: Low Risk  (2023)    Received from [facility], [facility]     Financial Resource Strain     Difficulty of Paying Living Expenses: 3     Difficulty of Paying Living Expenses: Not on file   Food Insecurity: No Food Insecurity (7/25/2023)    [facility]    Food Insecurity     Worried About Running Out of Food in the Last Year: 1   Transportation Needs: No Transportation Needs (7/25/2023)    [facility]    Transportation Needs     Lack of Transportation (Medical): 1   Physical Activity: Not on file   Stress: Not on file   Social Connections: Socially Integrated (7/25/2023)    [facility] "[facility] [facility] [facility]    Social Connections     Frequency of Communication with Friends and Family: 0   Interpersonal Safety: Not on file   Housing Stability: Low Risk  (7/25/2023)    [facility], [facility]    Housing Stability     Unable to Pay for Housing in the Last Year: 1          Medications  Allergies   Scheduled Meds:  Current Outpatient Medications   Medication Sig Dispense Refill    aspirin 81 MG tablet Take by mouth daily      atorvastatin (LIPITOR) 80 MG tablet Take 1 tablet (80 mg) by mouth daily 90 tablet 11    Bioflavonoid Products (VITAMIN C) CHEW Take 300 mg by mouth daily      Biotin 5000 MCG TABS Take by mouth daily      carvedilol (COREG) 12.5 MG tablet Take 1 tablet (12.5 mg) by mouth 2 times daily (with meals) 180 tablet 3    cholecalciferol (VITAMIN  -D) 1000 UNITS capsule Take 1 capsule by mouth daily      Fe-Succ Ac-B Kdhzn-W-Pm-FA (IROSPAN 24/6) MISC Take by mouth. 65mg 3 times a day      hydrochlorothiazide (HYDRODIURIL) 25 MG tablet Take 1 tablet (25 mg) by mouth daily (Patient not taking: Reported on 5/20/2024) 90 tablet 3     lisinopril (ZESTRIL) 30 MG tablet Take 1 tablet (30 mg) by mouth daily 90 tablet 3    Multiple Vitamins-Minerals (CENTRUM SILVER 50+MEN PO) Take by mouth daily      nitroGLYcerin (NITROSTAT) 0.4 MG sublingual tablet Place 1 tablet (0.4 mg) under the tongue every 5 minutes as needed for chest pain 25 tablet 3    No Known Allergies      Lab Results    Chemistry/lipid CBC Cardiac Enzymes/BNP/TSH/INR   Lab Results   Component Value Date    CHOL 130 03/01/2024    HDL 42 03/01/2024    TRIG 42 03/01/2024    BUN 18.0 03/01/2024     03/01/2024    CO2 24 03/01/2024    Lab Results   Component Value Date    WBC 0.7 (LL) 09/18/2024    HGB 9.8 (L) 09/18/2024    HCT 30.4 (L) 09/18/2024    MCV 81 09/18/2024    PLT 25 (LL) 09/18/2024    @RESUFAST(BMP,CBC,BNP,TSH,  INR)@      50 minutes spent reviewing prior records (including documentation, laboratory studies, cardiac testing/imaging), history and physical exam, planning, and subsequent documentation.     The longitudinal plan of care for the condition(s) below were addressed during this visit. Due to the added complexity in care, I will continue to support Pat in the subsequent management of this condition(s) and with the ongoing continuity of care of this condition(s):  coronary artery disease and congestive heart failure     This note has been dictated using voice recognition software. Any grammatical, typographical, or context distortions are unintentional and inherent to the software.    Suri Ambrocio PA-C, RD  General Cardiology

## 2024-10-14 ENCOUNTER — TELEPHONE (OUTPATIENT)
Dept: CARDIOLOGY | Facility: CLINIC | Age: 79
End: 2024-10-14
Payer: COMMERCIAL

## 2024-10-14 NOTE — TELEPHONE ENCOUNTER
Pt is scheduled for a [facility] appt on 10/21/24 with [facility]    Pt with a history of systolic heart failure..  Medication list reviewed and pt is not currently on Entresto, Jardiance, Farxiga, and Brenzaavy.    Please initiate coverage check for the above medications.  Thank you!  DEXTER Dickey([facility]) 10/14/24

## 2024-10-15 ENCOUNTER — TELEPHONE (OUTPATIENT)
Dept: CARDIOLOGY | Facility: CLINIC | Age: 79
End: 2024-10-15

## 2024-10-15 ENCOUNTER — LAB (OUTPATIENT)
Dept: LAB | Facility: CLINIC | Age: 79
End: 2024-10-15
Payer: COMMERCIAL

## 2024-10-15 DIAGNOSIS — I35.0 AORTIC STENOSIS: ICD-10-CM

## 2024-10-15 DIAGNOSIS — I42.9 CARDIOMYOPATHY, UNSPECIFIED TYPE (H): ICD-10-CM

## 2024-10-15 DIAGNOSIS — I42.9 CARDIOMYOPATHY (H): ICD-10-CM

## 2024-10-15 DIAGNOSIS — I25.10 CORONARY ARTERY DISEASE INVOLVING NATIVE CORONARY ARTERY OF NATIVE HEART WITHOUT ANGINA PECTORIS: ICD-10-CM

## 2024-10-15 LAB
ALT SERPL W P-5'-P-CCNC: 19 U/L (ref 0–70)
ANION GAP SERPL CALCULATED.3IONS-SCNC: 13 MMOL/L (ref 7–15)
BUN SERPL-MCNC: 22.8 MG/DL (ref 8–23)
CALCIUM SERPL-MCNC: 9.3 MG/DL (ref 8.8–10.4)
CHLORIDE SERPL-SCNC: 99 MMOL/L (ref 98–107)
CHOLEST SERPL-MCNC: 125 MG/DL
CREAT SERPL-MCNC: 0.71 MG/DL (ref 0.67–1.17)
EGFRCR SERPLBLD CKD-EPI 2021: >90 ML/MIN/1.73M2
ERYTHROCYTE [DISTWIDTH] IN BLOOD BY AUTOMATED COUNT: 30.6 % (ref 10–15)
FASTING STATUS PATIENT QL REPORTED: NO
GLUCOSE SERPL-MCNC: 329 MG/DL (ref 70–99)
HCO3 SERPL-SCNC: 29 MMOL/L (ref 22–29)
HCT VFR BLD AUTO: 28.9 % (ref 40–53)
HDLC SERPL-MCNC: 60 MG/DL
HGB BLD-MCNC: 9 G/DL (ref 13.3–17.7)
LDLC SERPL CALC-MCNC: 50 MG/DL
MCH RBC QN AUTO: 30.1 PG (ref 26.5–33)
MCHC RBC AUTO-ENTMCNC: 31.1 G/DL (ref 31.5–36.5)
MCV RBC AUTO: 97 FL (ref 78–100)
NONHDLC SERPL-MCNC: 65 MG/DL
PLATELET # BLD AUTO: 230 10E3/UL (ref 150–450)
POTASSIUM SERPL-SCNC: 4.3 MMOL/L (ref 3.4–5.3)
RBC # BLD AUTO: 2.99 10E6/UL (ref 4.4–5.9)
SODIUM SERPL-SCNC: 141 MMOL/L (ref 135–145)
T4 FREE SERPL-MCNC: 1.13 NG/DL (ref 0.9–1.7)
TRIGL SERPL-MCNC: 74 MG/DL
TSH SERPL DL<=0.005 MIU/L-ACNC: 5.06 UIU/ML (ref 0.3–4.2)
WBC # BLD AUTO: 16.1 10E3/UL (ref 4–11)

## 2024-10-15 PROCEDURE — 84443 ASSAY THYROID STIM HORMONE: CPT

## 2024-10-15 PROCEDURE — 84460 ALANINE AMINO (ALT) (SGPT): CPT

## 2024-10-15 PROCEDURE — 80061 LIPID PANEL: CPT

## 2024-10-15 PROCEDURE — 84439 ASSAY OF FREE THYROXINE: CPT

## 2024-10-15 PROCEDURE — 80048 BASIC METABOLIC PNL TOTAL CA: CPT

## 2024-10-15 PROCEDURE — 85027 COMPLETE CBC AUTOMATED: CPT

## 2024-10-15 PROCEDURE — 36415 COLL VENOUS BLD VENIPUNCTURE: CPT

## 2024-10-15 NOTE — TELEPHONE ENCOUNTER
"Team received information from Suri Ambrocio regarding lab results today.    \"Reviewed labs, TSH elevated but free T4 is normal.  But free T4 is normal.  At last visit the patient's diuretics was increased from 20 mg daily to 40 mg daily x 3 days To see if this would help with his shortness of breath.  Nursing staff, please let him know that all looked well with his thyroid study and his general lab work and see how he is feeling.  He is set up to see our team in [place] next week \"    Writer spoke with Stephanie, Cammy's wife. Cammy is napping after appointments today.   Informed her about lab results, including talking briefly about high blood sugar levels.   They have an apmnt tomorrow with the new PCP, and that is one of the specific conversations they will have, better glucose control.    Stephanie asking appropriate questions.    They will follow up with CORE clinic next week.    Stephanie verbalized understanding and agreement with the plan.      Chantell Aguilar RN, BSN          "

## 2024-10-15 NOTE — TELEPHONE ENCOUNTER
Patient has [employer] through an employer.    Jardiance:  $0/mo.    Farxiga:  $0/mo.    Entresto:  $0/mo.      Shirley Beckett  Pharmacy Technician/Liaison, Discharge Pharmacy   [phone] (voice or text)  [email or web address]

## 2024-10-21 ENCOUNTER — PATIENT OUTREACH (OUTPATIENT)
Dept: CARDIOLOGY | Facility: CLINIC | Age: 79
End: 2024-10-21

## 2024-10-21 ENCOUNTER — OFFICE VISIT (OUTPATIENT)
Dept: CARDIOLOGY | Facility: CLINIC | Age: 79
End: 2024-10-21
Payer: COMMERCIAL

## 2024-10-21 VITALS
OXYGEN SATURATION: 99 % | WEIGHT: 137.4 LBS | DIASTOLIC BLOOD PRESSURE: 67 MMHG | BODY MASS INDEX: 20.35 KG/M2 | HEIGHT: 69 IN | SYSTOLIC BLOOD PRESSURE: 111 MMHG | HEART RATE: 100 BPM

## 2024-10-21 DIAGNOSIS — I42.9 CARDIOMYOPATHY, UNSPECIFIED TYPE (H): ICD-10-CM

## 2024-10-21 DIAGNOSIS — I25.10 CORONARY ARTERY DISEASE INVOLVING NATIVE CORONARY ARTERY OF NATIVE HEART WITHOUT ANGINA PECTORIS: ICD-10-CM

## 2024-10-21 PROCEDURE — 99215 OFFICE O/P EST HI 40 MIN: CPT | Performed by: INTERNAL MEDICINE

## 2024-10-21 PROCEDURE — G2211 COMPLEX E/M VISIT ADD ON: HCPCS | Performed by: INTERNAL MEDICINE

## 2024-10-21 RX ORDER — TRAZODONE HYDROCHLORIDE 50 MG/1
100 TABLET, FILM COATED ORAL AT BEDTIME
COMMUNITY

## 2024-10-21 RX ORDER — GLIPIZIDE 5 MG/1
5 TABLET ORAL
COMMUNITY
End: 2024-10-31

## 2024-10-21 NOTE — PROGRESS NOTES
[employer]: Heart Failure Care Coordination   Heart Failure Education    Situation/Background:      RN CC provided heart failure education to Pat and wife Stephanie during  CORE Enrollment OV .    Assessment:      Living situation: home with family with wife Stephanie     Barriers to Heart Failure follow-up:  none noted    Medication management:  Wife Stephanie does set up. Takes out of pill bottles.  She's got a good routine.  Pt doesn't forget.     CHF assessment:  Respiratory  Shortness of breath:: No  Able to lie flat?: Yes  Is the patient on Oxygen?: Yes  How many liters of Oxygen?: 2-4 L  How often is Oxygen needed?: All the time  On CPAP?: No  Wheezing or noisy breathing?: No  Cough: Yes  Is your cough:: Loose;Congested;Non-productive  Increased sputum: No    Fever  Fever: No    Cardiovascular  Chest pain: : No  Heartbeat: Regular  Is patient on remote monitoring?: No  Dizzy or Lightheaded: No  Swelling:: Yes  Swelling location:: Ankles  Swelling severity:: Baseline  Swelling management:: Compression stockings  Bloating:: None  Fatigue:: No  Weakness (Heaviness in limbs):: Yes (Lost muscle tone)    Diet/Education/Medication  Checking weight daily? : No  Medication adherence problem (GOAL):: No  Effective medication organization strategy in place:: Yes  All new medication Rx filled: No  Knowledgeable about how to use meds:: Yes  Medication side effects suspected:: No  Does the patient have understanding of Diuretic self-management?: Yes  Diet:: 2000 mg of sodium    Patient Perception of Heart Failure  What Heart Failure zone are you currently in?: Green  Overall your CHF symptoms are (GOAL):: Improving  How confident are you with the plan we have identified?: Somewhat confident      Intervention/Plan:      CM/HF education topics reviewed:  Low sodium: 2000 mg or less daily, meal choices and label reading   Medication review and importance of compliance   Home blood pressure monitoring and logging   Overview of  C.O.R.E. clinic   Overview of heart failure appointments and testing   Symptoms of HF to be reported to Core Team        Education materials provided:  HF stoplight tool  CC intro letter given to pt via [employer]     Patient to follow up as scheduled.  RN CC reviewed and reinforced fluid/dietary sodium restrictions; patient stated understanding.  Instructed patient to call RN line with new or worsening heart failure symptoms and/or rapid weight gain.  Confirmed patient has clinic and scheduling phone numbers.     Patient expressed understanding of above education/instructions and denied further questions at this time.      Sendy Fuller RN BSN   [facility] [place], [place]  C.O.R.E. Clinic Care Coordinator  10/21/24, 9:35 AM

## 2024-10-21 NOTE — PROGRESS NOTES
Cardiology Clinic Progress Note  Nathan Martins MRN# [id number]   YOB: 1945 Age: 79 year old   Primary Cardiologist: Dr. Sabillon Reason for visit: CORE enrollment            Assessment and Plan:   Nathan Martins is a very pleasant 79 year old male who is here today for CORE enrollment.      1.  HFrEF and cardiomyopathy of undetermined etiology  - LVEF: 35% on [facility] echo 8/2024   - Etiology: undetermined  - Fluid status: euvolemic   - Diuretic regimen: Lasix 20 mg daily   - Ischemic evaluation: last cor angio 2012  - Guideline directed medical therapy:  - Beta blocker: Toprol XL 50 mg twice daily  - ACEI/ARB/ARNI: ACEi contraindicated as patient was taking at time of anaphylaxis episode. However, episode was felt anaphylaxis was related to paclitaxel.    - Aldactone antagonist: none yet  - SGLT2 inhibitor: Consider Jardiance, will reach out to PCP  -Counseled patient on fluid and sodium restriction  2.  Coronary artery disease status post bare-metal stent placement 4/2012.  Repeat cor angio 12/2012 revealed mild in-stent restenosis, otherwise no changes. Also has known distal occlusion of circumflex with good collaterals. No angina.  3.  Moderate aortic stenosis, MAGGI 1.3 cm , mean AoV pressure gradient 19 mmHg.  Will need yearly echocardiograms  4.  Dyslipidemia, on atorvastatin  5.  Hypertension  6.  Episode of PAF during hospitalization for anaphylaxis.  No subjective recurrence.  Discussed consideration of Zio patch monitor for further evaluation, however, given location of right sided port this is not feasible at this time.  7.  Diabetes mellitus type 2, recently diagnosed.  Now on metformin and glipizide.  Will reach out to Dr. Garcia regarding Jardiance initiation.    Changes today: none    Repeat echocardiogram in the near future. It is possible Mr. Martins's drop in EF noted in August 2024 was related to stress given anaphylaxis. Patient and family prefer re-evaluation of LVEF prior to  medication initiation in this setting which is reasonable.     Should LVEF remain depressed, initiation/uptitration of GDMT will be indicated. I will reach out to patient's PCP to discuss potential initiation of Jardiance as patient was recently diagnosed with diabetes mellitus and started on metformin and glipizide and is reportedly struggling with intermittent hypoglycemia.    Continue daily weights. Contact the clinic for a weight gain of 3 pounds in 1 day or 5 pounds in 1 week.     CORE follow-up after testing.     Fanny Toribio PA-C  [facility]  Pager: [phone]          History of Presenting Illness:    Nathan Martins is a very pleasant 79 year old male with a history of coronary artery disease s/p BMS to LAD 4/2012 (mild ISR noted during cath 12/2012) and known distal occlusion of circumflex with good collaterals, moderate aortic stenosis, hypertension, cardiomyopathy with LVEF 45 to 50% 3/2024, dyslipidemia,  MGUS, anemia, thrombocytopenia, and lung adenocarcinoma, stage III with a RUL mass, satellite lesions, and enlarged lymph nodes status post s/p cisplatin and etoposide and chemoradiation.  Plan is for 1 year of consolidation with durvalumab.    In March 2024, echocardiogram revealed LVEF 45 to 50%.  Inferolateral wall and inferoseptum appeared hypokinetic.  He was also noted to have mild to moderate valvular aortic stenosis with MAGGI 1.3 cm  with mean AoV pressure gradient 19 mmHg.  Around that same time, patient noted a 20 pound weight loss with worsening dyspnea. His hemoglobin had dropped from 14 to 8. He was subsequently found to have lung adenocarcinoma, stage III.  He was started on chemo and radiation and is now requiring supplemental oxygen.  In July 2024, he was admitted for anaphylaxis felt to be secondary to paclitaxel. However, patient was also on ACEi at that time which was discontinued as well. During his admission, he had an episode of PAF that spontaneously  converted to SR. He was seen by EP who did not recommend anticoagulation given patient's single AFIB episode, felt to be provoked by anaphylaxis. An echocardiogram was also performed during his hospitalization that revealed LVEF 35% with moderate generalized LV hypokinesis.     Patient met my colleague Suri Ambrocio PA-C, 10/7/2024.  At that time, he was feeling relatively well from a cardiac perspective with the exception of SOB. His diuretics were increased temporarily.  His main complaint was fatigue related to his cancer treatment.  She referred him for CORE enrollment which is why he presents to clinic today.    Patient is here today for CORE enrollment. Mr. Martins continues to feel well from a cardiac perspective.  Denies chest pain, palpitations, lightheadedness, dizziness, near-syncope or syncope.  Denies shortness of breath with exertion.  No orthopnea or PND.  His only complaint is of fatigue he believes is related to his cancer treatment.  He uses supplemental oxygen at all times.  Reports he is starting pulmonary rehab soon.  First immunotherapy infusion is scheduled for this Thursday.    Takes all medications daily as prescribed. Labs completed 10/15/2024.  BMP with normal renal function and electrolytes.  CBC with elevated WBC count at 16.1, hemoglobin stable but low at 9.0.  Lipid panel with , triglycerides 74, HDL 60, LDL 50.  ALT WNL.  TSH elevated but free T4 WNL. Blood pressure 111/67 and  in clinic today.     Wears compression stockings daily.  Weight has been relatively stable.        Social History       Social History     Socioeconomic History    Marital status:      Spouse name: Not on file    Number of children: Not on file    Years of education: Not on file    Highest education level: Not on file   Occupational History    Not on file   Tobacco Use    Smoking status: Former     Current packs/day: 0.00     Types: Cigarettes     Quit date: 1/5/2001     Years since quitting:  23.8    Smokeless tobacco: Never   Substance and Sexual Activity    Alcohol use: Yes     Alcohol/week: 0.0 standard drinks of alcohol     Comment: occas    Drug use: No    Sexual activity: Not on file   Other Topics Concern    Parent/sibling w/ CABG, MI or angioplasty before 65F 55M? Not Asked     Service Not Asked    Blood Transfusions Not Asked    Caffeine Concern No     Comment: 1-2 cups per day    Occupational Exposure Not Asked    Hobby Hazards Not Asked    Sleep Concern No    Stress Concern No    Weight Concern No    Special Diet No    Back Care Not Asked    Exercise Yes     Comment: walking dog 2 times daily    Bike Helmet Not Asked    Seat Belt Yes    Self-Exams Not Asked   Social History Narrative    Not on file     Social Determinants of Health     Financial Resource Strain: Low Risk  (7/25/2023)    Received [facility] [employer] [facility]    Financial Resource Strain     Difficulty of Paying Living Expenses: 3     Difficulty of Paying Living Expenses: Not on file   Food Insecurity: No Food Insecurity (7/25/2023)    Received from [employer] [facility]    Food Insecurity     Worried About Running Out of Food in the Last Year: 1   Transportation Needs: No Transportation Needs (7/25/2023)    [facility]    Transportation Needs     Lack of Transportation (Medical): 1   Physical Activity: Not on file   Stress: Not on file   Social Connections: Socially Integrated (7/25/2023)    Received from [employer] [facility], [facility] [employer] [facility]    Social Connections     Frequency of Communication with Friends and Family: 0   Interpersonal Safety: Not on file   Housing Stability: Low Risk  (7/25/2023)    Received from [employer] [facility]  "[facility], [facility] & [facility]    Housing Stability     Unable to Pay for Housing in the Last Year: 1            Review of Systems:   Please see HPI         Physical Exam:   Vitals: /67 (BP Location: Right arm, Patient Position: Sitting, Cuff Size: Adult Regular)   Pulse 100   Ht 1.753 m (5' 9\")   Wt 62.3 kg (137 lb 6.4 oz)   SpO2 99%   BMI 20.29 kg/m     Wt Readings from Last 4 Encounters:   10/07/24 63.5 kg (140 lb)   05/20/24 71 kg (156 lb 9.6 oz)   03/04/24 73.3 kg (161 lb 8 oz)   12/09/22 80.4 kg (177 lb 3.2 oz)     GEN: well nourished, in no acute distress.  HEENT:  Pupils equal, round. Sclerae nonicteric.   NECK: Supple, no masses appreciated. No JVD  C/V:  Regular rate and rhythm, 2/6 systolic murmur at RUSB  RESP: Respirations are unlabored. Clear to auscultation bilaterally without wheezing, rales, or rhonchi.  GI: Abdomen soft, nontender.  EXTREM: no LE edema.  NEURO: Alert and oriented, cooperative.  SKIN: Warm and dry.        Data:   LIPID RESULTS:  Lab Results   Component Value Date    CHOL 125 10/15/2024    CHOL 133 01/21/2019    HDL 60 10/15/2024    HDL 55 01/21/2019    LDL 50 10/15/2024    LDL 68.8 01/21/2019    TRIG 74 10/15/2024    TRIG 46 01/21/2019    CHOLHDLRATIO 2.2 10/29/2015     LIVER ENZYME RESULTS:  Lab Results   Component Value Date    AST 22.0 01/21/2019    ALT 19 10/15/2024    ALT 34.0 01/21/2019     CBC RESULTS:  Lab Results   Component Value Date    WBC 16.1 (H) 10/15/2024    WBC 9.0 01/21/2019    RBC 2.99 (L) 10/15/2024    RBC 4.59 01/21/2019    HGB 9.0 (L) 10/15/2024    HGB 14.6 01/21/2019    HCT 28.9 (L) 10/15/2024    HCT 44.0 01/21/2019    MCV 97 10/15/2024    MCV 95.9 01/21/2019    MCH 30.1 10/15/2024    MCH 31.8 01/21/2019    MCHC 31.1 (L) 10/15/2024    MCHC 33.2 01/21/2019    RDW 30.6 (H) 10/15/2024    RDW 13.6 01/21/2019     10/15/2024     01/21/2019     12/05/2012     BMP RESULTS:  Lab Results   Component " "Value Date     10/15/2024     01/21/2019    POTASSIUM 4.3 10/15/2024    POTASSIUM 4.3 10/07/2021    POTASSIUM 4.4 01/21/2019    CHLORIDE 99 10/15/2024    CHLORIDE 105 10/07/2021    CHLORIDE 107 01/21/2019    CO2 29 10/15/2024    CO2 26 10/07/2021    CO2 25 12/05/2012    ANIONGAP 13 10/15/2024    ANIONGAP 7 10/07/2021    ANIONGAP 10 12/05/2012     (H) 10/15/2024     (H) 10/07/2021     (A) 01/21/2019    BUN 22.8 10/15/2024    BUN 20 10/07/2021    BUN 16 01/21/2019    CR 0.71 10/15/2024    CR 0.84 01/21/2019    GFRESTIMATED >90 10/15/2024    GFRESTIMATED >90 12/05/2012    GFRESTBLACK >90 12/05/2012    TORY 9.3 10/15/2024    TORY 8.7 01/21/2019      A1C RESULTS:  No results found for: \"A1C\"  INR RESULTS:  Lab Results   Component Value Date    INR 1.03 12/05/2012            Medications     Current Outpatient Medications   Medication Sig Dispense Refill    atorvastatin (LIPITOR) 80 MG tablet Take 1 tablet (80 mg) by mouth daily 90 tablet 11    calcium carbonate antacid 1000 MG CHEW Take 3 tablets by mouth daily.      cholecalciferol (VITAMIN  -D) 1000 UNITS capsule Take 1 capsule by mouth daily      Fe-Succ Ac-B [id number] (IROSPAN 24/6) MISC Take 1 tablet by mouth daily.      furosemide (LASIX) 20 MG tablet Take 2 tablets (40 mg) by mouth daily for 3 days then resume 1 tablet (20 mg) by mouth daily. 35 tablet 5    metFORMIN (GLUCOPHAGE) 850 MG tablet Take 850 mg by mouth daily (with dinner).      metoprolol succinate ER (TOPROL XL) 50 MG 24 hr tablet Take 50 mg by mouth 2 times daily.      morphine (MS CONTIN) 15 MG CR tablet Take 15 mg by mouth every 12 hours.      Multiple Vitamins-Minerals (CENTRUM SILVER 50+MEN PO) Take by mouth daily      nitroGLYcerin (NITROSTAT) 0.4 MG sublingual tablet Place 1 tablet (0.4 mg) under the tongue every 5 minutes as needed for chest pain 25 tablet 3          Past Medical History     Past Medical History:   Diagnosis Date    Arthritis     generalized "    Cardiomyopathy (H)     4/2012 EF 30-35% by Cath, 12/2012 EF 50% by cath, 11/2012 EF 42% by Echo    Coronary artery disease     04/12 Proximal RCA 20% ( collateral filling of OM), CFX occluded with collaterals (collateral filling of distal OM), Proximal LAD, before first septal + 1st diag., has 85% stenosis,  Balloon angioplasty and BMS to proximal LAD, 12/2012 Cath - minimal in stent restenosis    Dyslipidemia     Hypertension     Stented coronary artery 4/3/12    04/12 Proximal RCA 20% ( collateral filling of OM), CFX occluded with collaterals,  (collateral filling of distal OM),Proximal LAD, before first septal + 1st diag., has 85% stenosis,  Balloon angioplasty and BMS to proximal LAD     Past Surgical History:   Procedure Laterality Date    ANGIOPLASTY  4/3/12     04/12 Proximal RCA 20% ( collateral filling of OM), CFX occluded with collaterals (collateral filling of distal OM), Proximal LAD, before first septal + 1st diag., has 85% stenosis,  Balloon angioplasty and BMS to proximal LAD, 12/2012 Cath - minimal in stent restenosis    CATARACT IOL, RT/LT      ORTHOPEDIC SURGERY      right elbow    OSTEOTOMY WRIST  5/22/2012    Procedure:OSTEOTOMY WRIST; Left Distal Radius Corrective Osteotomy       Family History   Problem Relation Age of Onset    Other - See Comments Mother 96        old age    Cancer Father 47        pancreatic    Diabetes Sister             Allergies   Patient has no known allergies.    The longitudinal plan of care for the diagnosis(es)/condition(s) as documented were addressed during this visit. Due to the added complexity in care, I will continue to support Pat in the subsequent management and with ongoing continuity of care.     40 minutes spent on the date of the encounter doing chart review, history and exam, documentation and further activities as noted above    Fanny Toribio PA-C  [facility]  Pager: [phone]

## 2024-10-21 NOTE — LETTER
[facility] - HEART FAILURE CARE COORDINATION  [address] [address] [address]    October 21, 2024    Nathan Martins  [address] [facility] [address]      Dear Cammy,    I am a heart failure care coordinator nurse who works with the doctors and staff at the Cardiology/C.O.R.E. clinic to help manage patient care. I am here to answer any questions or concerns about your heart health.    My goals are to help you manage your heart failure and to improve your access to care. I will work with your Cardiology team to support your health and social needs.     We will:    Create a care plan that supports communication between you and your Cardiology team.  Help you connect with health care resources.   Give you the information and knowledge you may need to manage your heart failure.    Work with you to remove any barriers you may have to your heart health.     We work to give you the highest quality healthcare. Please call me at [phone] with any questions about your heart failure care.    Sincerely,    [facility] Cardiology- Heart Failure  C.O.R.E. Clinic Care Coordination Nurse Team  [phone]

## 2024-10-21 NOTE — PATIENT INSTRUCTIONS
Today you had a visit in the CORE clinic. CORE stands for Cardiomyopathy Optimization Rehabilitation Education. The CORE clinic will teach and help you to manage your heart failure and keep you out of the hospital. Call the CORE nurse for any questions or concerns at [phone]      Plan:  1. Medication changes: none    2. Echocardiogram in the next few weeks. Follow up after testing. I will reach out to Dr. Garcia to discuss a potential medication change.   -Scheduling phone number: [phone]    It was great seeing you today!    Fanny Toribio PA-C  Physician Assistant  [facility]

## 2024-10-21 NOTE — LETTER
10/21/2024    QUE SARMIENTO MD  [address]  [facility] [address]    RE: Nathan Martins       Dear Colleague,     I had the pleasure of seeing Nathan Martins in the [facility].  Cardiology Clinic Progress Note  Nathan Martins MRN# [id number]   YOB: 1945 Age: 79 year old   Primary Cardiologist: Dr. Sabillon Reason for visit: CORE enrollment            Assessment and Plan:   Nathan Martins is a very pleasant 79 year old male who is here today for CORE enrollment.      1.  HFrEF and cardiomyopathy of undetermined etiology  - LVEF: 35% on [facility] echo 8/2024   - Etiology: undetermined  - Fluid status: euvolemic   - Diuretic regimen: Lasix 20 mg daily   - Ischemic evaluation: last cor angio 2012  - Guideline directed medical therapy:  - Beta blocker: Toprol XL 50 mg twice daily  - ACEI/ARB/ARNI: ACEi contraindicated as patient was taking at time of anaphylaxis episode. However, episode was felt anaphylaxis was related to paclitaxel.    - Aldactone antagonist: none yet  - SGLT2 inhibitor: Consider Jardiance, will reach out to PCP  -Counseled patient on fluid and sodium restriction  2.  Coronary artery disease status post bare-metal stent placement 4/2012.  Repeat cor angio 12/2012 revealed mild in-stent restenosis, otherwise no changes. Also has known distal occlusion of circumflex with good collaterals. No angina.  3.  Moderate aortic stenosis, MAGGI 1.3 cm , mean AoV pressure gradient 19 mmHg.  Will need yearly echocardiograms  4.  Dyslipidemia, on atorvastatin  5.  Hypertension  6.  Episode of PAF during hospitalization for anaphylaxis.  No subjective recurrence.  Discussed consideration of Zio patch monitor for further evaluation, however, given location of right sided port this is not feasible at this time.  7.  Diabetes mellitus type 2, recently diagnosed.  Now on metformin and glipizide.  Will reach out to Dr. Garcia regarding Jardiance initiation.    Changes today:  none    Repeat echocardiogram in the near future. It is possible Mr. Martins's drop in EF noted in August 2024 was related to stress given anaphylaxis. Patient and family prefer re-evaluation of LVEF prior to medication initiation in this setting which is reasonable.     Should LVEF remain depressed, initiation/uptitration of GDMT will be indicated. I will reach out to patient's PCP to discuss potential initiation of Jardiance as patient was recently diagnosed with diabetes mellitus and started on metformin and glipizide and is reportedly struggling with intermittent hypoglycemia.    Continue daily weights. Contact the clinic for a weight gain of 3 pounds in 1 day or 5 pounds in 1 week.     CORE follow-up after testing.     Fanny Toribio PA-C  [facility]  Pager: [phone]          History of Presenting Illness:    Nathan Martins is a very pleasant 79 year old male with a history of coronary artery disease s/p BMS to LAD 4/2012 (mild ISR noted during cath 12/2012) and known distal occlusion of circumflex with good collaterals, moderate aortic stenosis, hypertension, cardiomyopathy with LVEF 45 to 50% 3/2024, dyslipidemia,  MGUS, anemia, thrombocytopenia, and lung adenocarcinoma, stage III with a RUL mass, satellite lesions, and enlarged lymph nodes status post s/p cisplatin and etoposide and chemoradiation.  Plan is for 1 year of consolidation with durvalumab.    In March 2024, echocardiogram revealed LVEF 45 to 50%.  Inferolateral wall and inferoseptum appeared hypokinetic.  He was also noted to have mild to moderate valvular aortic stenosis with MAGGI 1.3 cm  with mean AoV pressure gradient 19 mmHg.  Around that same time, patient noted a 20 pound weight loss with worsening dyspnea. His hemoglobin had dropped from 14 to 8. He was subsequently found to have lung adenocarcinoma, stage III.  He was started on chemo and radiation and is now requiring supplemental oxygen.  In July 2024, he was  admitted for anaphylaxis felt to be secondary to paclitaxel. However, patient was also on ACEi at that time which was discontinued as well. During his admission, he had an episode of PAF that spontaneously converted to SR. He was seen by EP who did not recommend anticoagulation given patient's single AFIB episode, felt to be provoked by anaphylaxis. An echocardiogram was also performed during his hospitalization that revealed LVEF 35% with moderate generalized LV hypokinesis.     Patient met my colleague Suri Ambrocio PA-C, 10/7/2024.  At that time, he was feeling relatively well from a cardiac perspective with the exception of SOB. His diuretics were increased temporarily.  His main complaint was fatigue related to his cancer treatment.  She referred him for CORE enrollment which is why he presents to clinic today.    Patient is here today for CORE enrollment. Mr. Martins continues to feel well from a cardiac perspective.  Denies chest pain, palpitations, lightheadedness, dizziness, near-syncope or syncope.  Denies shortness of breath with exertion.  No orthopnea or PND.  His only complaint is of fatigue he believes is related to his cancer treatment.  He uses supplemental oxygen at all times.  Reports he is starting pulmonary rehab soon.  First immunotherapy infusion is scheduled for this Thursday.    Takes all medications daily as prescribed. Labs completed 10/15/2024.  BMP with normal renal function and electrolytes.  CBC with elevated WBC count at 16.1, hemoglobin stable but low at 9.0.  Lipid panel with , triglycerides 74, HDL 60, LDL 50.  ALT WNL.  TSH elevated but free T4 WNL. Blood pressure 111/67 and  in clinic today.     Wears compression stockings daily.  Weight has been relatively stable.        Social History       Social History     Socioeconomic History     Marital status:      Spouse name: Not on file     Number of children: Not on file     Years of education: Not on file      Highest education level: Not on file   Occupational History     Not on file   Tobacco Use     Smoking status: Former     Current packs/day: 0.00     Types: Cigarettes     Quit date: 2001     Years since quittin.8     Smokeless tobacco: Never   Substance and Sexual Activity     Alcohol use: Yes     Alcohol/week: 0.0 standard drinks of alcohol     Comment: occas     Drug use: No     Sexual activity: Not on file   Other Topics Concern     Parent/sibling w/ CABG, MI or angioplasty before 65F 55M? Not Asked      Service Not Asked     Blood Transfusions Not Asked     Caffeine Concern No     Comment: 1-2 cups per day     Occupational Exposure Not Asked     Hobby Hazards Not Asked     Sleep Concern No     Stress Concern No     Weight Concern No     Special Diet No     Back Care Not Asked     Exercise Yes     Comment: walking dog 2 times daily     Bike Helmet Not Asked     Seat Belt Yes     Self-Exams Not Asked   Social History Narrative     Not on file     Social Determinants of Health     Financial Resource Strain: Low Risk  (2023)    Received from [facility], [facility]    Financial Resource Strain      Difficulty of Paying Living Expenses: 3      Difficulty of Paying Living Expenses: Not on file   Food Insecurity: No Food Insecurity (2023)    Received from [facility], [facility]    Food Insecurity      Worried About Running Out of Food in the Last Year: 1   Transportation Needs: No Transportation Needs (2023)    Received from [facility]    Transportation Needs      Lack of Transportation (Medical): 1   Physical Activity: Not on file   Stress: Not on file   Social Connections: Socially Integrated (2023)    Received from [facility], [facility]  "[facility]    Social Connections      Frequency of Communication with Friends and Family: 0   Interpersonal Safety: Not on file   Housing Stability: Low Risk  (7/25/2023)    Received from [facility], [facility]    Housing Stability      Unable to Pay for Housing in the Last Year: 1            Review of Systems:   Please see HPI         Physical Exam:   Vitals: /67 (BP Location: Right arm, Patient Position: Sitting, Cuff Size: Adult Regular)   Pulse 100   Ht 1.753 m (5' 9\")   Wt 62.3 kg (137 lb 6.4 oz)   SpO2 99%   BMI 20.29 kg/m     Wt Readings from Last 4 Encounters:   10/07/24 63.5 kg (140 lb)   05/20/24 71 kg (156 lb 9.6 oz)   03/04/24 73.3 kg (161 lb 8 oz)   12/09/22 80.4 kg (177 lb 3.2 oz)     GEN: well nourished, in no acute distress.  HEENT:  Pupils equal, round. Sclerae nonicteric.   NECK: Supple, no masses appreciated. No JVD  C/V:  Regular rate and rhythm, 2/6 systolic murmur at RUSB  RESP: Respirations are unlabored. Clear to auscultation bilaterally without wheezing, rales, or rhonchi.  GI: Abdomen soft, nontender.  EXTREM: no LE edema.  NEURO: Alert and oriented, cooperative.  SKIN: Warm and dry.        Data:   LIPID RESULTS:  Lab Results   Component Value Date    CHOL 125 10/15/2024    CHOL 133 01/21/2019    HDL 60 10/15/2024    HDL 55 01/21/2019    LDL 50 10/15/2024    LDL 68.8 01/21/2019    TRIG 74 10/15/2024    TRIG 46 01/21/2019    CHOLHDLRATIO 2.2 10/29/2015     LIVER ENZYME RESULTS:  Lab Results   Component Value Date    AST 22.0 01/21/2019    ALT 19 10/15/2024    ALT 34.0 01/21/2019     CBC RESULTS:  Lab Results   Component Value Date    WBC 16.1 (H) 10/15/2024    WBC 9.0 01/21/2019    RBC 2.99 (L) 10/15/2024    RBC 4.59 01/21/2019    HGB 9.0 (L) 10/15/2024    HGB 14.6 01/21/2019    HCT 28.9 (L) 10/15/2024    HCT 44.0 01/21/2019    MCV 97 10/15/2024    MCV 95.9 01/21/2019    MCH 30.1 " "10/15/2024    MCH 31.8 01/21/2019    MCHC 31.1 (L) 10/15/2024    MCHC 33.2 01/21/2019    RDW 30.6 (H) 10/15/2024    RDW 13.6 01/21/2019     10/15/2024     01/21/2019     12/05/2012     BMP RESULTS:  Lab Results   Component Value Date     10/15/2024     01/21/2019    POTASSIUM 4.3 10/15/2024    POTASSIUM 4.3 10/07/2021    POTASSIUM 4.4 01/21/2019    CHLORIDE 99 10/15/2024    CHLORIDE 105 10/07/2021    CHLORIDE 107 01/21/2019    CO2 29 10/15/2024    CO2 26 10/07/2021    CO2 25 12/05/2012    ANIONGAP 13 10/15/2024    ANIONGAP 7 10/07/2021    ANIONGAP 10 12/05/2012     (H) 10/15/2024     (H) 10/07/2021     (A) 01/21/2019    BUN 22.8 10/15/2024    BUN 20 10/07/2021    BUN 16 01/21/2019    CR 0.71 10/15/2024    CR 0.84 01/21/2019    GFRESTIMATED >90 10/15/2024    GFRESTIMATED >90 12/05/2012    GFRESTBLACK >90 12/05/2012    TORY 9.3 10/15/2024    TORY 8.7 01/21/2019      A1C RESULTS:  No results found for: \"A1C\"  INR RESULTS:  Lab Results   Component Value Date    INR 1.03 12/05/2012            Medications     Current Outpatient Medications   Medication Sig Dispense Refill     atorvastatin (LIPITOR) 80 MG tablet Take 1 tablet (80 mg) by mouth daily 90 tablet 11     calcium carbonate antacid 1000 MG CHEW Take 3 tablets by mouth daily.       cholecalciferol (VITAMIN  -D) 1000 UNITS capsule Take 1 capsule by mouth daily       Fe-Succ Ac-B Haqca-I-Qu-FA (IROSPAN 24/6) MISC Take 1 tablet by mouth daily.       furosemide (LASIX) 20 MG tablet Take 2 tablets (40 mg) by mouth daily for 3 days then resume 1 tablet (20 mg) by mouth daily. 35 tablet 5     metFORMIN (GLUCOPHAGE) 850 MG tablet Take 850 mg by mouth daily (with dinner).       metoprolol succinate ER (TOPROL XL) 50 MG 24 hr tablet Take 50 mg by mouth 2 times daily.       morphine (MS CONTIN) 15 MG CR tablet Take 15 mg by mouth every 12 hours.       Multiple Vitamins-Minerals (CENTRUM SILVER 50+MEN PO) Take by mouth daily  "      nitroGLYcerin (NITROSTAT) 0.4 MG sublingual tablet Place 1 tablet (0.4 mg) under the tongue every 5 minutes as needed for chest pain 25 tablet 3          Past Medical History     Past Medical History:   Diagnosis Date     Arthritis     generalized     Cardiomyopathy (H)     4/2012 EF 30-35% by Cath, 12/2012 EF 50% by cath, 11/2012 EF 42% by Echo     Coronary artery disease     04/12 Proximal RCA 20% ( collateral filling of OM), CFX occluded with collaterals (collateral filling of distal OM), Proximal LAD, before first septal + 1st diag., has 85% stenosis,  Balloon angioplasty and BMS to proximal LAD, 12/2012 Cath - minimal in stent restenosis     Dyslipidemia      Hypertension      Stented coronary artery 4/3/12    04/12 Proximal RCA 20% ( collateral filling of OM), CFX occluded with collaterals,  (collateral filling of distal OM),Proximal LAD, before first septal + 1st diag., has 85% stenosis,  Balloon angioplasty and BMS to proximal LAD     Past Surgical History:   Procedure Laterality Date     ANGIOPLASTY  4/3/12     04/12 Proximal RCA 20% ( collateral filling of OM), CFX occluded with collaterals (collateral filling of distal OM), Proximal LAD, before first septal + 1st diag., has 85% stenosis,  Balloon angioplasty and BMS to proximal LAD, 12/2012 Cath - minimal in stent restenosis     CATARACT IOL, RT/LT       ORTHOPEDIC SURGERY      right elbow     OSTEOTOMY WRIST  5/22/2012    Procedure:OSTEOTOMY WRIST; Left Distal Radius Corrective Osteotomy       Family History   Problem Relation Age of Onset     Other - See Comments Mother 96        old age     Cancer Father 47        pancreatic     Diabetes Sister             Allergies   Patient has no known allergies.    The longitudinal plan of care for the diagnosis(es)/condition(s) as documented were addressed during this visit. Due to the added complexity in care, I will continue to support Pat in the subsequent management and with  ongoing continuity of care.     40 minutes spent on the date of the encounter doing chart review, history and exam, documentation and further activities as noted above    RASHI Spencer [facility] - Heart Care  Pager: [phone]      Thank you for allowing me to participate in the care of your patient.      Sincerely,     RASHI Spencer [facility]  cc:   Suri Ambrocio PA-C  [address] [place] [address]

## 2024-10-22 ENCOUNTER — PATIENT OUTREACH (OUTPATIENT)
Dept: CARDIOLOGY | Facility: CLINIC | Age: 79
End: 2024-10-22
Payer: COMMERCIAL

## 2024-10-22 DIAGNOSIS — I50.22 CHRONIC SYSTOLIC HEART FAILURE (H): Primary | ICD-10-CM

## 2024-10-22 DIAGNOSIS — E11.9 DM2 (DIABETES MELLITUS, TYPE 2) (H): ICD-10-CM

## 2024-10-22 DIAGNOSIS — I35.0 AORTIC STENOSIS: ICD-10-CM

## 2024-10-22 DIAGNOSIS — I48.0 PAROXYSMAL A-FIB (H): ICD-10-CM

## 2024-10-22 NOTE — TELEPHONE ENCOUNTER
"[facility] - C.O.R.E. Clinic     Pt seen in CORE Clinic 10/21/24:   Per Fanny Toribio, PAC: \"Can we reach out to patient's PCP Dr. Garcia to see thoughts on Jardiance initiation. Patient tells me he was recently dx with DM2 and started on metformin and glipizide and having intermittent low blood sugars.   If patient's updated echo reveals ongoing depressed LV function, I would like to start Jardiance and wondering if he would want me to stop glipizide at that time or continue? \"      Message sent to Dr Garcia regarding SGLT2i.      Future Appointments   Date Time Provider Department Center   11/6/2024  7:30 AM RSCCECHO2 [facility]   12/6/2024 10:15 AM RU LAB [facility]   12/12/2024  8:00 AM Fanny Toribio, RASHI [facility] CLIN         LORENZA ChandraN, RN 8:26 AM 10/22/24        "

## 2024-10-22 NOTE — LETTER
[facility] - HEART FAILURE CARE COORDINATION  [address] [address] [address]    October 31, 2024    Nathan Martins  [address] [facility] [address]      Dear Cammy,    I am a heart failure care coordinator nurse who works with the doctors and staff at the Cardiology/C.O.R.E. clinic to help manage patient care. I am here to answer any questions or concerns about your heart health.    My goals are to help you manage your heart failure and to improve your access to care. I will work with your Cardiology team to support your health and social needs.     We will:    Create a care plan that supports communication between you and your Cardiology team.  Help you connect with health care resources.   Give you the information and knowledge you may need to manage your heart failure.    Work with you to remove any barriers you may have to your heart health.     We work to give you the highest quality healthcare. Please call me at [phone] with any questions about your heart failure care.    Sincerely,        [facility] Cardiology- Heart Failure  C.O.R.E. Clinic Care Coordination Nurse Team  Phone: [phone]  Fax: [phone]

## 2024-10-28 NOTE — TELEPHONE ENCOUNTER
[facility] - C.O.R.E. Clinic  o  No response from PCP from staff message.     Call to clinic to speak to nurse team.    Allinia [phone]    Message left for a call back.

## 2024-10-31 PROBLEM — I50.22 CHRONIC SYSTOLIC HEART FAILURE (H): Status: ACTIVE | Noted: 2024-10-31

## 2024-10-31 PROBLEM — I48.0 PAROXYSMAL A-FIB (H): Status: ACTIVE | Noted: 2024-10-31

## 2024-10-31 PROBLEM — I35.0 AORTIC STENOSIS: Status: ACTIVE | Noted: 2024-10-31

## 2024-10-31 PROBLEM — E11.9 DM2 (DIABETES MELLITUS, TYPE 2) (H): Status: ACTIVE | Noted: 2024-10-31

## 2024-10-31 NOTE — TELEPHONE ENCOUNTER
"[facility] - C.O.R.E. Clinic   Call to Dr Black's office to speak to nursing team. Message left for Florencia. CORE number given for a call back to discuss:       Per Fanny Toribio, PAC: \"Can we reach out to patient's PCP Dr. Garcia to see thoughts on Jardiance initiation. Patient tells me he was recently dx with DM2 and started on metformin and glipizide and having intermittent low blood sugars. If patient's updated echo reveals ongoing depressed LV function, I would like to start Jardiance and wondering if he would want me to stop glipizide at that time or continue? \"         echocardiogram 3/12/2024 (outside hospital)  The visual ejection fraction is 45-50%.   Inferolateral wall and inferoseptum appear hypokinetic.   Mild to moderate valvular aortic stenosis.   The calculated aortic valve area is 1.3cm2.   The mean AoV pressure gradient is 19mmHg.   Contrast was used without apparent complications.     ECHOCARDIOGRAM 8/4/24  1. Normal left ventricular chamber size. Normal left ventricular wall thickness. Moderately decreased left ventricular systolic function.  Calculated left ventricular ejection fraction (modified Beckford technique) is 35%. Moderate generalized left ventricular hypokinesis.  There is significant [id number] variability limiting EF assessment and assessment of aortic valve stenosis.  2. Normal right ventricular chamber size. Normal right ventricular systolic function.  3. Mild-moderate calcific aortic valve stenosis. Aortic valve systolic mean gradient is 26 mmHg. Aortic valve systolic peak velocity is  3.3 m/sec. Aortic valve area by Doppler is 1.7 cm . Trivial aortic valve regurgitation.  4. Moderate biatrial enlargement.  5. Possible patent foramen ovale with left-to-right shunt on color Doppler.    There were no prior studies available for comparison.  Estimated EF: 30-35%       ADDENDUM: 10/31/24 at 1130: Spoke to Florencia. Discussed metformin, glipizide, intermittent " hypoglycemia. Discussed Jardiance initiation and if glipizide should be continued or stopped when Jardiance is started. She will review with Dr Garcia.   Faxed 10/15/24 cardiology OV and echo from 8/4/2024.      Pati Raza, LORENZAN, RN 8:55 AM 10/31/24  FLAKO Chandra, RN 11:40 AM 10/31/24

## 2024-10-31 NOTE — TELEPHONE ENCOUNTER
"[facility] - C.O.R.E. Clinic   Florencia returned call after reviewing with Dr Garcia: \"Ok to stop glipizide and start jardiance.  He has appt next week so can review glucose levels and add back glipizide if needed. :    Fanny updated verbally.     Call to pt to discuss. Spoke to pt and wife (CTC on file). Reviewed plan of stopping glipizide and starting Jardiance. Wife asked if they should stop metformin. Updated them that medication is to be continued. Did let pt and wife know that at next week's appt with Dr Garcia, labs will reviewed (including blood glucose) and add back glipizide if needed.     Did discuss most common potential side effect is recurrent UTIs or yeast infection     Wife expressed understanding and will  tomorrow, 11/1. Confirmed pharmacy.   Prescription sent.       Glipizide removed from med list                  Future Appointments   Date Time Provider Department Center   11/6/2024  7:30 AM RSCCECHO2 [facility] RSCC   12/6/2024 10:15 AM RU LAB [facility] [place] [facility]   12/12/2024  8:00 AM Fanny Toribio PA-C [facility] PSA CLIN       Pati Raza, FLAKO, RN 1:53 PM 10/31/24    "

## 2024-11-06 ENCOUNTER — PATIENT OUTREACH (OUTPATIENT)
Dept: CARDIOLOGY | Facility: CLINIC | Age: 79
End: 2024-11-06

## 2024-11-06 ENCOUNTER — HOSPITAL ENCOUNTER (OUTPATIENT)
Dept: CARDIOLOGY | Facility: CLINIC | Age: 79
Discharge: HOME | End: 2024-11-06
Attending: INTERNAL MEDICINE | Admitting: INTERNAL MEDICINE
Payer: COMMERCIAL

## 2024-11-06 DIAGNOSIS — I42.9 CARDIOMYOPATHY, UNSPECIFIED TYPE (H): ICD-10-CM

## 2024-11-06 DIAGNOSIS — I50.22 CHRONIC SYSTOLIC HEART FAILURE (H): Primary | ICD-10-CM

## 2024-11-06 LAB — LVEF ECHO: NORMAL

## 2024-11-06 PROCEDURE — 255N000002 HC RX 255 OP 636: Performed by: INTERNAL MEDICINE

## 2024-11-06 PROCEDURE — 93306 TTE W/DOPPLER COMPLETE: CPT | Mod: 26 | Performed by: INTERNAL MEDICINE

## 2024-11-06 PROCEDURE — 999N000208 ECHOCARDIOGRAM COMPLETE

## 2024-11-06 RX ORDER — SPIRONOLACTONE 25 MG/1
25 TABLET ORAL DAILY
Qty: 90 TABLET | Refills: 3 | Status: SHIPPED | OUTPATIENT
Start: 2024-11-06

## 2024-11-06 RX ADMIN — HUMAN ALBUMIN MICROSPHERES AND PERFLUTREN 3 ML: 10; .22 INJECTION, SOLUTION INTRAVENOUS at 08:11

## 2024-11-06 NOTE — PROGRESS NOTES
[facility]: Heart Failure Care Coordination   Documentation    Situation/Background:      Chief Complaint   Patient presents with    Results     Echo- ongoing LV dysfunction.  Start spironolactone     Fanny Toribio PA-C  11/6/2024 11:11 AM CST Back to Top      Repeat echocardiogram shows ongoing severe LV dysfunction with LVEF 25-30%.  I am glad he has started Jardiance.  Can we contact patient/his wife and let them know these echocardiogram results.  GDMT initiation/optimization is indicated at this time (wife wanted to hold off until we confirmed EF remained depressed on echo recheck).  Recommend starting spironolactone 25 mg once daily. BMP should be completed prior to our upcoming apt in December.     Fanny Toribio PA-C on 11/6/2024 at 11:09 AM     Echocardiogram 11/6/24  Interpretation Summary     The left ventricle is normal in size.  Severely decreased left ventricular systolic function.  The visual ejection fraction is 25-30%.  Inferior & inferolateral wall akinesis. Severe global hypokinesis.  Normal right ventricular size and systolic function.  Moderate to severe calcific aortic valve stenosis with trace regurgitation.  Hemodynamics may be unreliable due to reduced LVOT TVI and stroke-volume index  of 30 ml/m2. Clinical correlation recommended.  Peak velocity 2.5 m/s, mean gradient 15 mmHg, valve area 1.0 cmÂ ,  dimensionless index 0.26.  Dilated inferior vena cava.     On outside echocardiogram dated 8/4/2024, LVEF of 35%, mild to moderate aortic  valve stenosis reported.     Assessment:      Attempt #1: Called Pat's home line requesting call back from Cammy or wife Stephanie (CTC on file) to discuss echocardiogram results and FRANCINE Spencer's recommendations to start a new medication.     Addendum @ 1445:  Received call back from wife Stephanie who put phone on speaker w/ Cammy.  Had long conversation reviewing/discussing echocardiogram results and recommendations.  They confirm ok to start  spironolactone.     Spironolactone Rx sent to preferred pharmacy: [facility] off [address] in [place]    Pt reports he's in green zone for stoplight took.  No edema to ankles.  No worsening HF sx. Weighing daily before bathroom.  Wt stable.   Saw PCP this AM for DM.      Intervention/Plan:      Patient to follow up as scheduled.  RN CC reviewed and reinforced fluid/dietary sodium restrictions; patient stated understanding.  Instructed patient to call RN line with new or worsening heart failure symptoms and/or rapid weight gain.  Confirmed patient has clinic and scheduling phone numbers.     Future Appointments   Date Time Provider Department Center   12/6/2024 10:15 AM RU LAB [facility]   12/12/2024  8:00 AM Fanny Toribio PA-C [facility] PSA CLIN       Sendy Fuller, BECKY BSN   [facility] [place], [place]  C.OParulRMINDY. Clinic Care Coordinator  11/06/24, 1:09 PM

## 2024-11-23 ENCOUNTER — HEALTH MAINTENANCE LETTER (OUTPATIENT)
Age: 79
End: 2024-11-23

## 2024-12-12 ENCOUNTER — PATIENT OUTREACH (OUTPATIENT)
Dept: CARDIOLOGY | Facility: CLINIC | Age: 79
End: 2024-12-12

## 2024-12-12 ENCOUNTER — OFFICE VISIT (OUTPATIENT)
Dept: CARDIOLOGY | Facility: CLINIC | Age: 79
End: 2024-12-12
Attending: INTERNAL MEDICINE
Payer: COMMERCIAL

## 2024-12-12 VITALS
SYSTOLIC BLOOD PRESSURE: 102 MMHG | OXYGEN SATURATION: 95 % | WEIGHT: 122 LBS | DIASTOLIC BLOOD PRESSURE: 58 MMHG | HEART RATE: 95 BPM | HEIGHT: 69 IN | BODY MASS INDEX: 18.07 KG/M2

## 2024-12-12 DIAGNOSIS — I48.0 PAROXYSMAL A-FIB (H): ICD-10-CM

## 2024-12-12 DIAGNOSIS — I48.92 ATRIAL FLUTTER, UNSPECIFIED TYPE (H): Primary | ICD-10-CM

## 2024-12-12 DIAGNOSIS — I25.10 CORONARY ARTERY DISEASE INVOLVING NATIVE CORONARY ARTERY OF NATIVE HEART WITHOUT ANGINA PECTORIS: ICD-10-CM

## 2024-12-12 DIAGNOSIS — I42.9 CARDIOMYOPATHY, UNSPECIFIED TYPE (H): ICD-10-CM

## 2024-12-12 DIAGNOSIS — I50.22 CHRONIC SYSTOLIC HEART FAILURE (H): ICD-10-CM

## 2024-12-12 RX ORDER — NITROGLYCERIN 0.4 MG/1
0.4 TABLET SUBLINGUAL EVERY 5 MIN PRN
Qty: 25 TABLET | Refills: 3 | Status: SHIPPED | OUTPATIENT
Start: 2024-12-12

## 2024-12-12 RX ORDER — MIRTAZAPINE 15 MG/1
TABLET, FILM COATED ORAL AT BEDTIME
COMMUNITY

## 2024-12-12 RX ORDER — METOPROLOL SUCCINATE 50 MG/1
TABLET, EXTENDED RELEASE ORAL
Qty: 360 TABLET | Refills: 3 | Status: SHIPPED | OUTPATIENT
Start: 2024-12-12

## 2024-12-12 RX ORDER — FUROSEMIDE 20 MG/1
TABLET ORAL
Qty: 90 TABLET | Refills: 3 | Status: SHIPPED | OUTPATIENT
Start: 2024-12-12

## 2024-12-12 NOTE — LETTER
12/12/2024    Aleksandr Garcia MD  [id number]  [facility] [address]    RE: Nathan Martins       Dear Colleague,     I had the pleasure of seeing Nathan Martins in the [facility].  Cardiology Clinic Progress Note  Nathan Martins MRN# [id number]   YOB: 1945 Age: 79 year old   Primary Cardiologist: Dr. Sabillon  Reason for visit: CORE follow-up             Assessment and Plan:   Nathan Martins is a very pleasant 79 year old male who is here today for CORE follow-up.      1.  HFrEF and cardiomyopathy of undetermined etiology  - LVEF: 35% on [facility] echo 8/2024   - Etiology: undetermined  - Fluid status: hypervolemic  - Diuretic regimen: Lasix 20 mg daily x 5 days then use as needed  - Ischemic evaluation: last cor angio 2012  - Guideline directed medical therapy:  - Beta blocker: Toprol  mg twice daily  - ACEI/ARB/ARNI: ACEi contraindicated as patient was taking at time of anaphylaxis episode. However, episode was felt anaphylaxis was related to paclitaxel.    - Aldactone antagonist: Spironolactone 25 mg once daily  - SGLT2 inhibitor: Jardiance 10 mg once daily  -Counseled patient on fluid and sodium restriction  2.  Atypical atrial flutter noted on ECG 12/6/2024, Eliquis initiated 12/7/2024 AM for cardioembolic risk reduction in the setting of elevated [id number] score of 6 (age x 2, CAD, HTN, DM, CM).  Rates remain elevated-Will uptitrate Toprol-XL to 100 mg twice daily with plan for cardioversion after 3+ weeks of uninterrupted anticoagulation.   3.  Episode of PAF during hospitalization for anaphylaxis 7/2024  4.  Coronary artery disease status post bare-metal stent placement 4/2012.  Repeat cor angio 12/2012 revealed mild in-stent restenosis, otherwise no changes. Also has known distal occlusion of circumflex with good collaterals. No angina.  5.  Moderate aortic stenosis, MAGGI 1.3 cm , mean AoV pressure gradient 19 mmHg.  Will need yearly echocardiograms  6.   Dyslipidemia, on atorvastatin  7.  Hypertension  8.  Diabetes mellitus type 2  9.  Lung adenocarcinoma, stage III with a RUL mass, satellite lesions, and enlarged lymph nodes status post cisplatin and etoposide and chemoradiation.  Plan is for 1 year of consolidation with durvalumab.  On chronic supplemental oxygen    Changes today:   Increase Toprol-XL to 100 mg twice daily. Follow-up 24-hour Holter monitor recommended to evaluate ventricular rates on increased dose of Toprol-XL.    Given crackles in bilateral lung bases, recommend resuming Lasix at 20 mg once daily x 5 days, then will use as needed.      Goals of care discussion had with patient and his wife.  Patient prefers ongoing aggressive medical management of his HFrEF and arrhythmia.  As such, we discussed various rate and rhythm control strategies available for managing his atrial flutter.  After discussion, he prefers to proceed with cardioversion with the goal of restoring normal sinus rhythm.  Can pursue cardioversion 12/30 or after (Eliquis initiated 12/7).    Patient aware of need to remain NPO 8 hours prior to procedure.  He will need a ride to and from the procedure.  He should hold his Jardiance 3 days prior to cardioversion and hold his Toprol-XL the morning of his procedure.    The risks and benefits of direct current cardioversion were reviewed with the patient including but not limited to the inherent risks of anesthesia, skin irritation, the development of profound bradycardia, and stroke. Patient verbalized understanding and wishes to proceed.    Follow-up after procedure.  We will continue GDMT optimization and also discuss options for ischemic evaluation at that time.    Fanny Toribio PA-C  [facility]  Pager: [phone]          History of Presenting Illness:    Nathan Martins is a very pleasant 79 year old male with a history of coronary artery disease s/p BMS to LAD 4/2012 (mild ISR noted during cath 12/2012)  and known distal occlusion of circumflex with good collaterals, moderate aortic stenosis, hypertension, cardiomyopathy with LVEF 45 to 50% 3/2024, dyslipidemia,  MGUS, anemia, thrombocytopenia, and lung adenocarcinoma, stage III with a RUL mass, satellite lesions, and enlarged lymph nodes status post s/p cisplatin and etoposide and chemoradiation.  Plan is for 1 year of consolidation with durvalumab.     In March 2024, echocardiogram revealed LVEF 45 to 50%.  Inferolateral wall and inferoseptum appeared hypokinetic.  He was also noted to have mild to moderate valvular aortic stenosis with MAGGI 1.3 cm  with mean AoV pressure gradient 19 mmHg.  Around that same time, patient noted a 20 pound weight loss with worsening dyspnea. His hemoglobin had dropped from 14 to 8. He was subsequently found to have lung adenocarcinoma, stage III.  He was started on chemo and radiation and is now requiring supplemental oxygen.  In July 2024, he was admitted for anaphylaxis felt to be secondary to paclitaxel. However, patient was also on ACEi at that time which was discontinued as well. During his admission, he had an episode of PAF that spontaneously converted to SR. He was seen by EP who did not recommend anticoagulation given patient's single AFIB episode, felt to be provoked by anaphylaxis. An echocardiogram was also performed during his hospitalization that revealed LVEF 35% with moderate generalized LV hypokinesis.      Patient met my colleague Suri Ambrocio PA-C, 10/7/2024.  At that time, he was feeling relatively well from a cardiac perspective with the exception of SOB. His diuretics were increased temporarily.  His main complaint was fatigue related to his cancer treatment.  She referred him for CORE enrollment.    I met Mr. Martins 10/21/2024 for CORE enrollment.  At that time, he remained stable from a cardiac perspective.  Patient and his wife preferred for repeat echocardiogram prior to GDMT initiation/further testing to  evaluate etiology of cardiomyopathy to ensure LVEF remains low, as we discussed it was possible LVEF had dropped due to anaphylaxis episode.      Repeat echocardiogram was completed 2024 that revealed ongoing severe LV dysfunction with LVEF 25 to 30%.  Jardiance was initiated and glipizide was discontinued.  Spironolactone 25 mg once daily was initiated 1 week later.    Patient's wife contacted the clinic 1 week ago describing profound weight loss, worsening fatigue, and new elevated heart rates around 120 bpm.  I arranged ECG and labs.  ECG with atypical atrial flutter,  bpm.  Toprol XL was uptitrated and Eliquis 5 mg twice daily was initiated for cardioembolic risk reduction 2024.    Patient is here today for CORE follow-up.  Pat continues to feel unwell, struggling with profound fatigue.  He is also developed loss of appetite and nausea which is making it hard for him to consume food.  His oncologist recently started mirtazapine to help with his appetite.  They have not noticed improvement.  Cardiac wise, things remain mostly stable.  He continues to experience fatigue and exertional dyspnea.  His heart rates have decreased since up titration of metoprolol but remain elevated above norm.    He is taking all of his medications daily as prescribed.  Blood pressure 102/58 and heart rate 95 in clinic today.      Social History       Social History     Socioeconomic History     Marital status:      Spouse name: Not on file     Number of children: Not on file     Years of education: Not on file     Highest education level: Not on file   Occupational History     Not on file   Tobacco Use     Smoking status: Former     Current packs/day: 0.00     Types: Cigarettes     Quit date: 2001     Years since quittin.9     Smokeless tobacco: Never   Substance and Sexual Activity     Alcohol use: Not Currently     Drug use: No     Sexual activity: Not on file   Other Topics Concern     Parent/sibling  w/ CABG, MI or angioplasty before 65F 55M? Not Asked      Service Not Asked     Blood Transfusions Not Asked     Caffeine Concern No     Comment: 1-2 cups per day     Occupational Exposure Not Asked     Hobby Hazards Not Asked     Sleep Concern No     Stress Concern No     Weight Concern No     Special Diet No     Back Care Not Asked     Exercise Yes     Comment: walking dog 2 times daily     Bike Helmet Not Asked     Seat Belt Yes     Self-Exams Not Asked   Social History Narrative     Not on file     Social Drivers of Health     Financial Resource Strain: Low Risk  (7/25/2023)    [facility]    Financial Resource Strain      Difficulty of Paying Living Expenses: 3      Difficulty of Paying Living Expenses: Not on file   Food Insecurity: No Food Insecurity (6/17/2024)    Received from [facility]    Food Insecurity      Do you worry your food will run out before you are able to buy more?: 1   Transportation Needs: No Transportation Needs (6/17/2024)    Received from [facility]    Transportation Needs      Does lack of transportation keep you from medical appointments?: 1      Does lack of transportation keep you from work, meetings or getting things that you need?: 1   Physical Activity: Not on file   Stress: Not on file   Social Connections: Socially Integrated (6/17/2024)    [facility]    Social Connections      Do you often feel lonely or isolated from those around you?: 0   Interpersonal Safety: Not on file   Housing Stability: Low Risk  (6/17/2024)    Received from [facility]    Housing Stability      What is your housing situation today?: 1            Review of Systems:   Please see HPI         Physical Exam:   Vitals: /58 (BP Location: Right arm, Patient  "Position: Sitting, Cuff Size: Adult Regular)   Pulse 95   Ht 1.753 m (5' 9\")   Wt 55.3 kg (122 lb)   SpO2 95%   BMI 18.02 kg/m     Wt Readings from Last 4 Encounters:   12/12/24 55.3 kg (122 lb)   10/21/24 62.3 kg (137 lb 6.4 oz)   10/07/24 63.5 kg (140 lb)   05/20/24 71 kg (156 lb 9.6 oz)     GEN: Appears cachectic and fatigued, in no acute distress.  HEENT:  Pupils equal, round. Sclerae nonicteric.   NECK: Supple, no masses appreciated. No JVD  C/V: Irregular rhythm, controlled rate.  RESP: Respirations are unlabored. Crackles in bilateral lung bases  GI: Abdomen soft, nontender.  EXTREM: no LE edema.  NEURO: Alert and oriented, cooperative.  SKIN: Warm and dry.        Data:   LIPID RESULTS:  Lab Results   Component Value Date    CHOL 125 10/15/2024    CHOL 133 01/21/2019    HDL 60 10/15/2024    HDL 55 01/21/2019    LDL 50 10/15/2024    LDL 68.8 01/21/2019    TRIG 74 10/15/2024    TRIG 46 01/21/2019    CHOLHDLRATIO 2.2 10/29/2015     LIVER ENZYME RESULTS:  Lab Results   Component Value Date    AST 22.0 01/21/2019    ALT 19 10/15/2024    ALT 34.0 01/21/2019     CBC RESULTS:  Lab Results   Component Value Date    WBC 16.1 (H) 10/15/2024    WBC 9.0 01/21/2019    RBC 2.99 (L) 10/15/2024    RBC 4.59 01/21/2019    HGB 9.0 (L) 10/15/2024    HGB 14.6 01/21/2019    HCT 28.9 (L) 10/15/2024    HCT 44.0 01/21/2019    MCV 97 10/15/2024    MCV 95.9 01/21/2019    MCH 30.1 10/15/2024    MCH 31.8 01/21/2019    MCHC 31.1 (L) 10/15/2024    MCHC 33.2 01/21/2019    RDW 30.6 (H) 10/15/2024    RDW 13.6 01/21/2019     10/15/2024     01/21/2019     12/05/2012     BMP RESULTS:  Lab Results   Component Value Date     12/06/2024     01/21/2019    POTASSIUM 4.5 12/06/2024    POTASSIUM 4.3 10/07/2021    POTASSIUM 4.4 01/21/2019    CHLORIDE 98 12/06/2024    CHLORIDE 105 10/07/2021    CHLORIDE 107 01/21/2019    CO2 23 12/06/2024    CO2 26 10/07/2021    CO2 25 12/05/2012    ANIONGAP 16 (H) 12/06/2024    " "ANIONGAP 7 10/07/2021    ANIONGAP 10 12/05/2012     (H) 12/06/2024     (H) 10/07/2021     (A) 01/21/2019    BUN 35.6 (H) 12/06/2024    BUN 20 10/07/2021    BUN 16 01/21/2019    CR 0.80 12/06/2024    CR 0.84 01/21/2019    GFRESTIMATED 90 12/06/2024    GFRESTIMATED >90 12/05/2012    GFRESTBLACK >90 12/05/2012    TORY 9.6 12/06/2024    TORY 8.7 01/21/2019      A1C RESULTS:  No results found for: \"A1C\"  INR RESULTS:  Lab Results   Component Value Date    INR 1.03 12/05/2012            Medications     Current Outpatient Medications   Medication Sig Dispense Refill     apixaban ANTICOAGULANT (ELIQUIS ANTICOAGULANT) 5 MG tablet Take 1 tablet (5 mg) by mouth 2 times daily. 180 tablet 1     atorvastatin (LIPITOR) 80 MG tablet Take 1 tablet (80 mg) by mouth daily 90 tablet 11     calcium carbonate antacid 1000 MG CHEW Take 3 tablets by mouth daily.       cholecalciferol (VITAMIN  -D) 1000 UNITS capsule Take 1 capsule by mouth daily       empagliflozin (JARDIANCE) 10 MG TABS tablet Take 1 tablet (10 mg) by mouth daily. 90 tablet 1     Fe-Succ Ac-B Nyjqp-O-Xa-FA (IROSPAN 24/6) MISC Take 1 tablet by mouth daily.       furosemide (LASIX) 20 MG tablet Take 2 tablets (40 mg) by mouth daily for 3 days then resume 1 tablet (20 mg) by mouth daily. 35 tablet 5     metFORMIN (GLUCOPHAGE) 850 MG tablet Take 850 mg by mouth 2 times daily (with meals).       metoprolol succinate ER (TOPROL XL) 50 MG 24 hr tablet Take 2 tablets (100 mg) by mouth every morning AND 1 tablet (50 mg) every evening. 270 tablet 1     morphine (MS CONTIN) 15 MG CR tablet Take 15 mg by mouth daily.       Multiple Vitamins-Minerals (CENTRUM SILVER 50+MEN PO) Take by mouth daily       nitroGLYcerin (NITROSTAT) 0.4 MG sublingual tablet Place 1 tablet (0.4 mg) under the tongue every 5 minutes as needed for chest pain 25 tablet 3     spironolactone (ALDACTONE) 25 MG tablet Take 1 tablet (25 mg) by mouth daily. 90 tablet 3     traZODone (DESYREL) 50 MG " tablet Take 100 mg by mouth at bedtime.            Past Medical History     Past Medical History:   Diagnosis Date     Arthritis     generalized     Cardiomyopathy (H)     4/2012 EF 30-35% by Cath, 12/2012 EF 50% by cath, 11/2012 EF 42% by Echo     Coronary artery disease     04/12 Proximal RCA 20% ( collateral filling of OM), CFX occluded with collaterals (collateral filling of distal OM), Proximal LAD, before first septal + 1st diag., has 85% stenosis,  Balloon angioplasty and BMS to proximal LAD, 12/2012 Cath - minimal in stent restenosis     Dyslipidemia      Hypertension      Stented coronary artery 4/3/12    04/12 Proximal RCA 20% ( collateral filling of OM), CFX occluded with collaterals,  (collateral filling of distal OM),Proximal LAD, before first septal + 1st diag., has 85% stenosis,  Balloon angioplasty and BMS to proximal LAD     Past Surgical History:   Procedure Laterality Date     ANGIOPLASTY  4/3/12     04/12 Proximal RCA 20% ( collateral filling of OM), CFX occluded with collaterals (collateral filling of distal OM), Proximal LAD, before first septal + 1st diag., has 85% stenosis,  Balloon angioplasty and BMS to proximal LAD, 12/2012 Cath - minimal in stent restenosis     CATARACT IOL, RT/LT       ORTHOPEDIC SURGERY      right elbow     OSTEOTOMY WRIST  5/22/2012    Procedure:OSTEOTOMY WRIST; Left Distal Radius Corrective Osteotomy       Family History   Problem Relation Age of Onset     Other - See Comments Mother 96        old age     Cancer Father 47        pancreatic     Diabetes Sister             Allergies   Paclitaxel    The longitudinal plan of care for the diagnosis(es)/condition(s) as documented were addressed during this visit. Due to the added complexity in care, I will continue to support Pat in the subsequent management and with ongoing continuity of care.     45 minutes spent on the date of the encounter doing chart review, history and exam, documentation  and further activities as noted above    RASHI [facility]  Pager: [phone]    Thank you for allowing me to participate in the care of your patient.      Sincerely,     RASHI [facility]  cc:   Fanny Toribio PA-C  [address] [place] [address]

## 2024-12-12 NOTE — PATIENT INSTRUCTIONS
Call CORE nurse for any questions or concerns Mon-Fri 8am-4pm:                                                 #[phone]                                       For concerns after hours:                                               #[phone]      Medication changes:     Increase metoprolol to 100 mg twice daily    Start Lasix 20 mg once daily x 5 days    Plan from today:     24 hour Holter monitor, we will try to place this today    Cardioversion 12/30 or after    For your cardioversion:   Nothing to eat or drink after midnight the night prior except sips of water with medications.     DO NOT take metoprolol the morning the of the procedure.     THREE DAYS prior to the procedure STOP taking the Jardiance.     You will need a ride to and from the procedure.

## 2024-12-12 NOTE — TELEPHONE ENCOUNTER
[facility]: Heart Failure Care Coordination   Documentation    Situation/Background:      Chief Complaint   Patient presents with    Clinic Care Coordination - Heart Failure     12/12/24 CORE Return AV       Met with pt and wife after CORE Return appt.     Discussed medication changes.     Discussed 24 hour Holter monitor placement with EP, able to apply today while pt is in clinic.     Diet education and fluid restriction discussed.     Discussed DCCV and meds to hold.       Intervention/Plan:      Wife and pt agreeable and comfortable with plan. EP tech in room to apply Holter.     Wife will schedule DCCV when they check out from today's appt. Given scheduling number in case procedure date does not work.       Pati Raza, LORENZAN, RN 9:15 AM 12/12/24

## 2024-12-12 NOTE — PROGRESS NOTES
Cardiology Clinic Progress Note  Nathan Martins MRN# [id number]   YOB: 1945 Age: 79 year old   Primary Cardiologist: Dr. Sabillon  Reason for visit: CORE follow-up             Assessment and Plan:   Nathan Martins is a very pleasant 79 year old male who is here today for CORE follow-up.      1.  HFrEF and cardiomyopathy of undetermined etiology  - LVEF: 35% on [facility] echo 8/2024   - Etiology: undetermined  - Fluid status: hypervolemic  - Diuretic regimen: Lasix 20 mg daily x 5 days then use as needed  - Ischemic evaluation: last cor angio 2012  - Guideline directed medical therapy:  - Beta blocker: Toprol  mg twice daily  - ACEI/ARB/ARNI: ACEi contraindicated as patient was taking at time of anaphylaxis episode. However, episode was felt anaphylaxis was related to paclitaxel.    - Aldactone antagonist: Spironolactone 25 mg once daily  - SGLT2 inhibitor: Jardiance 10 mg once daily  -Counseled patient on fluid and sodium restriction  2.  Atypical atrial flutter noted on ECG 12/6/2024, Eliquis initiated 12/7/2024 AM for cardioembolic risk reduction in the setting of elevated [id number] score of 6 (age x 2, CAD, HTN, DM, CM).  Rates remain elevated-Will uptitrate Toprol-XL to 100 mg twice daily with plan for cardioversion after 3+ weeks of uninterrupted anticoagulation.   3.  Episode of PAF during hospitalization for anaphylaxis 7/2024  4.  Coronary artery disease status post bare-metal stent placement 4/2012.  Repeat cor angio 12/2012 revealed mild in-stent restenosis, otherwise no changes. Also has known distal occlusion of circumflex with good collaterals. No angina.  5.  Moderate aortic stenosis, MAGGI 1.3 cm , mean AoV pressure gradient 19 mmHg.  Will need yearly echocardiograms  6.  Dyslipidemia, on atorvastatin  7.  Hypertension  8.  Diabetes mellitus type 2  9.  Lung adenocarcinoma, stage III with a RUL mass, satellite lesions, and enlarged lymph nodes status post cisplatin and etoposide  and chemoradiation.  Plan is for 1 year of consolidation with durvalumab.  On chronic supplemental oxygen    Changes today:   Increase Toprol-XL to 100 mg twice daily. Follow-up 24-hour Holter monitor recommended to evaluate ventricular rates on increased dose of Toprol-XL.    Given crackles in bilateral lung bases, recommend resuming Lasix at 20 mg once daily x 5 days, then will use as needed.      Goals of care discussion had with patient and his wife.  Patient prefers ongoing aggressive medical management of his HFrEF and arrhythmia.  As such, we discussed various rate and rhythm control strategies available for managing his atrial flutter.  After discussion, he prefers to proceed with cardioversion with the goal of restoring normal sinus rhythm.  Can pursue cardioversion 12/30 or after (Eliquis initiated 12/7).    Patient aware of need to remain NPO 8 hours prior to procedure.  He will need a ride to and from the procedure.  He should hold his Jardiance 3 days prior to cardioversion and hold his Toprol-XL the morning of his procedure.    The risks and benefits of direct current cardioversion were reviewed with the patient including but not limited to the inherent risks of anesthesia, skin irritation, the development of profound bradycardia, and stroke. Patient verbalized understanding and wishes to proceed.    Follow-up after procedure.  We will continue GDMT optimization and also discuss options for ischemic evaluation at that time.    Fanny Toribio PA-C  [facility]  Pager: [phone]          History of Presenting Illness:    Nathan Martins is a very pleasant 79 year old male with a history of coronary artery disease s/p BMS to LAD 4/2012 (mild ISR noted during cath 12/2012) and known distal occlusion of circumflex with good collaterals, moderate aortic stenosis, hypertension, cardiomyopathy with LVEF 45 to 50% 3/2024, dyslipidemia,  MGUS, anemia, thrombocytopenia, and lung  adenocarcinoma, stage III with a RUL mass, satellite lesions, and enlarged lymph nodes status post s/p cisplatin and etoposide and chemoradiation.  Plan is for 1 year of consolidation with durvalumab.     In March 2024, echocardiogram revealed LVEF 45 to 50%.  Inferolateral wall and inferoseptum appeared hypokinetic.  He was also noted to have mild to moderate valvular aortic stenosis with MAGGI 1.3 cm  with mean AoV pressure gradient 19 mmHg.  Around that same time, patient noted a 20 pound weight loss with worsening dyspnea. His hemoglobin had dropped from 14 to 8. He was subsequently found to have lung adenocarcinoma, stage III.  He was started on chemo and radiation and is now requiring supplemental oxygen.  In July 2024, he was admitted for anaphylaxis felt to be secondary to paclitaxel. However, patient was also on ACEi at that time which was discontinued as well. During his admission, he had an episode of PAF that spontaneously converted to SR. He was seen by EP who did not recommend anticoagulation given patient's single AFIB episode, felt to be provoked by anaphylaxis. An echocardiogram was also performed during his hospitalization that revealed LVEF 35% with moderate generalized LV hypokinesis.      Patient met my colleague Suri Ambrocio PA-C, 10/7/2024.  At that time, he was feeling relatively well from a cardiac perspective with the exception of SOB. His diuretics were increased temporarily.  His main complaint was fatigue related to his cancer treatment.  She referred him for CORE enrollment.    I met Mr. Martins 10/21/2024 for CORE enrollment.  At that time, he remained stable from a cardiac perspective.  Patient and his wife preferred for repeat echocardiogram prior to GDMT initiation/further testing to evaluate etiology of cardiomyopathy to ensure LVEF remains low, as we discussed it was possible LVEF had dropped due to anaphylaxis episode.      Repeat echocardiogram was completed 11/6/2024 that revealed  ongoing severe LV dysfunction with LVEF 25 to 30%.  Jardiance was initiated and glipizide was discontinued.  Spironolactone 25 mg once daily was initiated 1 week later.    Patient's wife contacted the clinic 1 week ago describing profound weight loss, worsening fatigue, and new elevated heart rates around 120 bpm.  I arranged ECG and labs.  ECG with atypical atrial flutter,  bpm.  Toprol XL was uptitrated and Eliquis 5 mg twice daily was initiated for cardioembolic risk reduction 2024.    Patient is here today for CORE follow-up.  Pat continues to feel unwell, struggling with profound fatigue.  He is also developed loss of appetite and nausea which is making it hard for him to consume food.  His oncologist recently started mirtazapine to help with his appetite.  They have not noticed improvement.  Cardiac wise, things remain mostly stable.  He continues to experience fatigue and exertional dyspnea.  His heart rates have decreased since up titration of metoprolol but remain elevated above norm.    He is taking all of his medications daily as prescribed.  Blood pressure 102/58 and heart rate 95 in clinic today.      Social History       Social History     Socioeconomic History    Marital status:      Spouse name: Not on file    Number of children: Not on file    Years of education: Not on file    Highest education level: Not on file   Occupational History    Not on file   Tobacco Use    Smoking status: Former     Current packs/day: 0.00     Types: Cigarettes     Quit date: 2001     Years since quittin.9    Smokeless tobacco: Never   Substance and Sexual Activity    Alcohol use: Not Currently    Drug use: No    Sexual activity: Not on file   Other Topics Concern    Parent/sibling w/ CABG, MI or angioplasty before 65F 55M? Not Asked     Service Not Asked    Blood Transfusions Not Asked    Caffeine Concern No     Comment: 1-2 cups per day    Occupational Exposure Not Asked    Hobby  "Hazards Not Asked    Sleep Concern No    Stress Concern No    Weight Concern No    Special Diet No    Back Care Not Asked    Exercise Yes     Comment: walking dog 2 times daily    Bike Helmet Not Asked    Seat Belt Yes    Self-Exams Not Asked   Social History Narrative    Not on file     Social Drivers of Health     Financial Resource Strain: Low Risk  (7/25/2023)    [facility], [facility]    Financial Resource Strain     Difficulty of Paying Living Expenses: 3     Difficulty of Paying Living Expenses: Not on file   Food Insecurity: No Food Insecurity (6/17/2024)    Received from [facility]    Food Insecurity     Do you worry your food will run out before you are able to buy more?: 1   Transportation Needs: No Transportation Needs (6/17/2024)    [facility]    Transportation Needs     Does lack of transportation keep you from medical appointments?: 1     Does lack of transportation keep you from work, meetings or getting things that you need?: 1   Physical Activity: Not on file   Stress: Not on file   Social Connections: Socially Integrated (6/17/2024)    [facility]    Social Connections     Do you often feel lonely or isolated from those around you?: 0   Interpersonal Safety: Not on file   Housing Stability: Low Risk  (6/17/2024)    [facility]    Housing Stability     What is your housing situation today?: 1            Review of Systems:   Please see HPI         Physical Exam:   Vitals: /58 (BP Location: Right arm, Patient Position: Sitting, Cuff Size: Adult Regular)   Pulse 95   Ht 1.753 m (5' 9\")   Wt 55.3 kg (122 lb)   SpO2 95%   BMI 18.02 kg/m     Wt Readings from Last 4 Encounters:   12/12/24 55.3 kg (122 lb)   10/21/24 62.3 kg (137 lb 6.4 oz) "   10/07/24 63.5 kg (140 lb)   05/20/24 71 kg (156 lb 9.6 oz)     GEN: Appears cachectic and fatigued, in no acute distress.  HEENT:  Pupils equal, round. Sclerae nonicteric.   NECK: Supple, no masses appreciated. No JVD  C/V: Irregular rhythm, controlled rate.  RESP: Respirations are unlabored. Crackles in bilateral lung bases  GI: Abdomen soft, nontender.  EXTREM: no LE edema.  NEURO: Alert and oriented, cooperative.  SKIN: Warm and dry.        Data:   LIPID RESULTS:  Lab Results   Component Value Date    CHOL 125 10/15/2024    CHOL 133 01/21/2019    HDL 60 10/15/2024    HDL 55 01/21/2019    LDL 50 10/15/2024    LDL 68.8 01/21/2019    TRIG 74 10/15/2024    TRIG 46 01/21/2019    CHOLHDLRATIO 2.2 10/29/2015     LIVER ENZYME RESULTS:  Lab Results   Component Value Date    AST 22.0 01/21/2019    ALT 19 10/15/2024    ALT 34.0 01/21/2019     CBC RESULTS:  Lab Results   Component Value Date    WBC 16.1 (H) 10/15/2024    WBC 9.0 01/21/2019    RBC 2.99 (L) 10/15/2024    RBC 4.59 01/21/2019    HGB 9.0 (L) 10/15/2024    HGB 14.6 01/21/2019    HCT 28.9 (L) 10/15/2024    HCT 44.0 01/21/2019    MCV 97 10/15/2024    MCV 95.9 01/21/2019    MCH 30.1 10/15/2024    MCH 31.8 01/21/2019    MCHC 31.1 (L) 10/15/2024    MCHC 33.2 01/21/2019    RDW 30.6 (H) 10/15/2024    RDW 13.6 01/21/2019     10/15/2024     01/21/2019     12/05/2012     BMP RESULTS:  Lab Results   Component Value Date     12/06/2024     01/21/2019    POTASSIUM 4.5 12/06/2024    POTASSIUM 4.3 10/07/2021    POTASSIUM 4.4 01/21/2019    CHLORIDE 98 12/06/2024    CHLORIDE 105 10/07/2021    CHLORIDE 107 01/21/2019    CO2 23 12/06/2024    CO2 26 10/07/2021    CO2 25 12/05/2012    ANIONGAP 16 (H) 12/06/2024    ANIONGAP 7 10/07/2021    ANIONGAP 10 12/05/2012     (H) 12/06/2024     (H) 10/07/2021     (A) 01/21/2019    BUN 35.6 (H) 12/06/2024    BUN 20 10/07/2021    BUN 16 01/21/2019    CR 0.80 12/06/2024    CR 0.84 01/21/2019  "   GFRESTIMATED 90 12/06/2024    GFRESTIMATED >90 12/05/2012    GFRESTBLACK >90 12/05/2012    TORY 9.6 12/06/2024    TORY 8.7 01/21/2019      A1C RESULTS:  No results found for: \"A1C\"  INR RESULTS:  Lab Results   Component Value Date    INR 1.03 12/05/2012            Medications     Current Outpatient Medications   Medication Sig Dispense Refill    apixaban ANTICOAGULANT (ELIQUIS ANTICOAGULANT) 5 MG tablet Take 1 tablet (5 mg) by mouth 2 times daily. 180 tablet 1    atorvastatin (LIPITOR) 80 MG tablet Take 1 tablet (80 mg) by mouth daily 90 tablet 11    calcium carbonate antacid 1000 MG CHEW Take 3 tablets by mouth daily.      cholecalciferol (VITAMIN  -D) 1000 UNITS capsule Take 1 capsule by mouth daily      empagliflozin (JARDIANCE) 10 MG TABS tablet Take 1 tablet (10 mg) by mouth daily. 90 tablet 1    Fe-Succ Ac-B Vceym-T-Ia-FA (IROSPAN 24/6) MISC Take 1 tablet by mouth daily.      furosemide (LASIX) 20 MG tablet Take 2 tablets (40 mg) by mouth daily for 3 days then resume 1 tablet (20 mg) by mouth daily. 35 tablet 5    metFORMIN (GLUCOPHAGE) 850 MG tablet Take 850 mg by mouth 2 times daily (with meals).      metoprolol succinate ER (TOPROL XL) 50 MG 24 hr tablet Take 2 tablets (100 mg) by mouth every morning AND 1 tablet (50 mg) every evening. 270 tablet 1    morphine (MS CONTIN) 15 MG CR tablet Take 15 mg by mouth daily.      Multiple Vitamins-Minerals (CENTRUM SILVER 50+MEN PO) Take by mouth daily      nitroGLYcerin (NITROSTAT) 0.4 MG sublingual tablet Place 1 tablet (0.4 mg) under the tongue every 5 minutes as needed for chest pain 25 tablet 3    spironolactone (ALDACTONE) 25 MG tablet Take 1 tablet (25 mg) by mouth daily. 90 tablet 3    traZODone (DESYREL) 50 MG tablet Take 100 mg by mouth at bedtime.            Past Medical History     Past Medical History:   Diagnosis Date    Arthritis     generalized    Cardiomyopathy (H)     4/2012 EF 30-35% by Cath, 12/2012 EF 50% by cath, 11/2012 EF 42% by Echo    " Coronary artery disease     04/12 Proximal RCA 20% ( collateral filling of OM), CFX occluded with collaterals (collateral filling of distal OM), Proximal LAD, before first septal + 1st diag., has 85% stenosis,  Balloon angioplasty and BMS to proximal LAD, 12/2012 Cath - minimal in stent restenosis    Dyslipidemia     Hypertension     Stented coronary artery 4/3/12    04/12 Proximal RCA 20% ( collateral filling of OM), CFX occluded with collaterals,  (collateral filling of distal OM),Proximal LAD, before first septal + 1st diag., has 85% stenosis,  Balloon angioplasty and BMS to proximal LAD     Past Surgical History:   Procedure Laterality Date    ANGIOPLASTY  4/3/12     04/12 Proximal RCA 20% ( collateral filling of OM), CFX occluded with collaterals (collateral filling of distal OM), Proximal LAD, before first septal + 1st diag., has 85% stenosis,  Balloon angioplasty and BMS to proximal LAD, 12/2012 Cath - minimal in stent restenosis    CATARACT IOL, RT/LT      ORTHOPEDIC SURGERY      right elbow    OSTEOTOMY WRIST  5/22/2012    Procedure:OSTEOTOMY WRIST; Left Distal Radius Corrective Osteotomy       Family History   Problem Relation Age of Onset    Other - See Comments Mother 96        old age    Cancer Father 47        pancreatic    Diabetes Sister             Allergies   Paclitaxel    The longitudinal plan of care for the diagnosis(es)/condition(s) as documented were addressed during this visit. Due to the added complexity in care, I will continue to support Pat in the subsequent management and with ongoing continuity of care.     45 minutes spent on the date of the encounter doing chart review, history and exam, documentation and further activities as noted above    Fanny Toribio PA-C  [facility]  Pager: [phone]

## 2024-12-17 NOTE — TELEPHONE ENCOUNTER
"[facility]   Wife left message after clinic hours requesting a call back.   Returned call. Wife states pt \"is not doing well. His cancer is worse. We had a phone visit with the oncologist yesterday and she wants to see us in the office tomorrow. My daughter in law and son are going with.\"  Wife will call [facility] tomorrow with an update from oncology.      ANKIT to Fanny.       Future Appointments   Date Time Provider Department Center   12/31/2024 10:30 AM [facility] SUITES ([facility]) SHCARE None   1/10/2025  9:00 AM [facility] LAB [facility] [place] RID   1/14/2025  8:00 AM Fanny Toribio PA-C [facility] PSA CLIN   3/11/2025 10:00 AM Raman Sabillon MD [facility] CLIN         FLAKO Chandra, RN 10:09 AM 12/17/24     "